# Patient Record
Sex: MALE | Race: WHITE | Employment: PART TIME | URBAN - METROPOLITAN AREA
[De-identification: names, ages, dates, MRNs, and addresses within clinical notes are randomized per-mention and may not be internally consistent; named-entity substitution may affect disease eponyms.]

---

## 2021-02-07 ENCOUNTER — HOSPITAL ENCOUNTER (EMERGENCY)
Age: 26
Discharge: HOME | End: 2021-02-07
Attending: EMERGENCY MEDICINE
Payer: MEDICAID

## 2021-02-07 ENCOUNTER — APPOINTMENT (OUTPATIENT)
Dept: GENERAL RADIOLOGY | Age: 26
End: 2021-02-07
Attending: PHYSICIAN ASSISTANT
Payer: MEDICAID

## 2021-02-07 VITALS
WEIGHT: 240 LBS | BODY MASS INDEX: 33.6 KG/M2 | SYSTOLIC BLOOD PRESSURE: 134 MMHG | TEMPERATURE: 97.8 F | DIASTOLIC BLOOD PRESSURE: 90 MMHG | OXYGEN SATURATION: 98 % | HEART RATE: 91 BPM | RESPIRATION RATE: 16 BRPM | HEIGHT: 71 IN

## 2021-02-07 DIAGNOSIS — Z72.0 CURRENT TOBACCO USE: ICD-10-CM

## 2021-02-07 DIAGNOSIS — K92.0 HEMATEMESIS WITH NAUSEA: Primary | ICD-10-CM

## 2021-02-07 DIAGNOSIS — F10.10 ALCOHOL ABUSE: ICD-10-CM

## 2021-02-07 LAB
ABO + RH BLD: NORMAL
ALBUMIN SERPL-MCNC: 3.8 G/DL (ref 3.4–5)
ALBUMIN/GLOB SERPL: 1 {RATIO} (ref 0.8–1.7)
ALP SERPL-CCNC: 56 U/L (ref 45–117)
ALT SERPL-CCNC: 55 U/L (ref 16–61)
ANION GAP SERPL CALC-SCNC: 10 MMOL/L (ref 3–18)
AST SERPL-CCNC: 58 U/L (ref 10–38)
ATRIAL RATE: 73 BPM
BASOPHILS # BLD: 0 K/UL (ref 0–0.1)
BASOPHILS NFR BLD: 1 % (ref 0–2)
BILIRUB SERPL-MCNC: 0.3 MG/DL (ref 0.2–1)
BLOOD GROUP ANTIBODIES SERPL: NORMAL
BUN SERPL-MCNC: 9 MG/DL (ref 7–18)
BUN/CREAT SERPL: 9 (ref 12–20)
CALCIUM SERPL-MCNC: 8.8 MG/DL (ref 8.5–10.1)
CALCULATED P AXIS, ECG09: -3 DEGREES
CALCULATED R AXIS, ECG10: -8 DEGREES
CALCULATED T AXIS, ECG11: 4 DEGREES
CHLORIDE SERPL-SCNC: 106 MMOL/L (ref 100–111)
CK MB CFR SERPL CALC: NORMAL % (ref 0–4)
CK MB SERPL-MCNC: <1 NG/ML (ref 5–25)
CK SERPL-CCNC: 170 U/L (ref 39–308)
CO2 SERPL-SCNC: 25 MMOL/L (ref 21–32)
CREAT SERPL-MCNC: 1 MG/DL (ref 0.6–1.3)
DIAGNOSIS, 93000: NORMAL
DIFFERENTIAL METHOD BLD: ABNORMAL
EOSINOPHIL # BLD: 0.1 K/UL (ref 0–0.4)
EOSINOPHIL NFR BLD: 2 % (ref 0–5)
ERYTHROCYTE [DISTWIDTH] IN BLOOD BY AUTOMATED COUNT: 12.6 % (ref 11.6–14.5)
GLOBULIN SER CALC-MCNC: 3.8 G/DL (ref 2–4)
GLUCOSE SERPL-MCNC: 93 MG/DL (ref 74–99)
HCT VFR BLD AUTO: 41.4 % (ref 36–48)
HGB BLD-MCNC: 14.3 G/DL (ref 13–16)
LIPASE SERPL-CCNC: 134 U/L (ref 73–393)
LYMPHOCYTES # BLD: 2 K/UL (ref 0.9–3.6)
LYMPHOCYTES NFR BLD: 39 % (ref 21–52)
MCH RBC QN AUTO: 31.6 PG (ref 24–34)
MCHC RBC AUTO-ENTMCNC: 34.5 G/DL (ref 31–37)
MCV RBC AUTO: 91.4 FL (ref 74–97)
MONOCYTES # BLD: 0.5 K/UL (ref 0.05–1.2)
MONOCYTES NFR BLD: 9 % (ref 3–10)
NEUTS SEG # BLD: 2.6 K/UL (ref 1.8–8)
NEUTS SEG NFR BLD: 49 % (ref 40–73)
P-R INTERVAL, ECG05: 132 MS
PLATELET # BLD AUTO: 223 K/UL (ref 135–420)
PMV BLD AUTO: 9.4 FL (ref 9.2–11.8)
POTASSIUM SERPL-SCNC: 3.8 MMOL/L (ref 3.5–5.5)
PROT SERPL-MCNC: 7.6 G/DL (ref 6.4–8.2)
Q-T INTERVAL, ECG07: 356 MS
QRS DURATION, ECG06: 86 MS
QTC CALCULATION (BEZET), ECG08: 392 MS
RBC # BLD AUTO: 4.53 M/UL (ref 4.7–5.5)
SODIUM SERPL-SCNC: 141 MMOL/L (ref 136–145)
SPECIMEN EXP DATE BLD: NORMAL
TROPONIN I SERPL-MCNC: <0.02 NG/ML (ref 0–0.04)
VENTRICULAR RATE, ECG03: 73 BPM
WBC # BLD AUTO: 5.3 K/UL (ref 4.6–13.2)

## 2021-02-07 PROCEDURE — C9113 INJ PANTOPRAZOLE SODIUM, VIA: HCPCS | Performed by: PHYSICIAN ASSISTANT

## 2021-02-07 PROCEDURE — 82553 CREATINE MB FRACTION: CPT

## 2021-02-07 PROCEDURE — 96374 THER/PROPH/DIAG INJ IV PUSH: CPT

## 2021-02-07 PROCEDURE — 80053 COMPREHEN METABOLIC PANEL: CPT

## 2021-02-07 PROCEDURE — 96375 TX/PRO/DX INJ NEW DRUG ADDON: CPT

## 2021-02-07 PROCEDURE — 71045 X-RAY EXAM CHEST 1 VIEW: CPT

## 2021-02-07 PROCEDURE — 99282 EMERGENCY DEPT VISIT SF MDM: CPT

## 2021-02-07 PROCEDURE — 83690 ASSAY OF LIPASE: CPT

## 2021-02-07 PROCEDURE — 85025 COMPLETE CBC W/AUTO DIFF WBC: CPT

## 2021-02-07 PROCEDURE — 93005 ELECTROCARDIOGRAM TRACING: CPT

## 2021-02-07 PROCEDURE — 96361 HYDRATE IV INFUSION ADD-ON: CPT

## 2021-02-07 PROCEDURE — 86901 BLOOD TYPING SEROLOGIC RH(D): CPT

## 2021-02-07 PROCEDURE — 74011250636 HC RX REV CODE- 250/636: Performed by: PHYSICIAN ASSISTANT

## 2021-02-07 RX ORDER — PANTOPRAZOLE SODIUM 40 MG/1
40 TABLET, DELAYED RELEASE ORAL DAILY
Qty: 30 TAB | Refills: 0 | Status: SHIPPED | OUTPATIENT
Start: 2021-02-07 | End: 2021-03-09

## 2021-02-07 RX ORDER — ONDANSETRON 4 MG/1
4 TABLET, ORALLY DISINTEGRATING ORAL
Qty: 12 TAB | Refills: 0 | Status: SHIPPED | OUTPATIENT
Start: 2021-02-07

## 2021-02-07 RX ORDER — PANTOPRAZOLE SODIUM 40 MG/10ML
40 INJECTION, POWDER, LYOPHILIZED, FOR SOLUTION INTRAVENOUS
Status: COMPLETED | OUTPATIENT
Start: 2021-02-07 | End: 2021-02-07

## 2021-02-07 RX ORDER — ONDANSETRON 2 MG/ML
4 INJECTION INTRAMUSCULAR; INTRAVENOUS
Status: COMPLETED | OUTPATIENT
Start: 2021-02-07 | End: 2021-02-07

## 2021-02-07 RX ADMIN — PANTOPRAZOLE SODIUM 40 MG: 40 INJECTION, POWDER, FOR SOLUTION INTRAVENOUS at 19:08

## 2021-02-07 RX ADMIN — SODIUM CHLORIDE 1000 ML: 900 INJECTION, SOLUTION INTRAVENOUS at 19:04

## 2021-02-07 RX ADMIN — ONDANSETRON 4 MG: 2 INJECTION INTRAMUSCULAR; INTRAVENOUS at 19:08

## 2021-02-07 NOTE — ED PROVIDER NOTES
EMERGENCY DEPARTMENT HISTORY AND PHYSICAL EXAM    Date: 2/7/2021  Patient Name: Sami Weaver    History of Presenting Illness     Chief Complaint   Patient presents with    Vomiting Blood         History Provided By: Patient    6:36 PM  Sami Weaver is a 31 y.o. male who presents to the emergency department C/O of vomiting blood. Patient states just prior to arrival he was laughing, suddenly felt nauseous and like he would vomit. He then vomited bright red blood which subsequently became dark. 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 Patient does admit to smoking cigarettes but after armani Covid last month he has significantly decreased. He also drinks alcohol daily for at least the past year, more so over the past month; approximately 4th of a liter of vodka daily. No frequent NSAID use. Pt denies chest pain, shortness of breath, bloody or black stools, diarrhea, abdominal pain, and any other sxs or complaints. PCP: Yulissa Cruz MD        Past History     Past Medical History:  Past Medical History:   Diagnosis Date    ADHD     Anxiety     [facility]     Multiple personalities [facility])        Past Surgical History:  History reviewed. No pertinent surgical history. Family History:  History reviewed. No pertinent family history. Social History:  Social History     Tobacco Use    Smoking status: Current Every Day Smoker   Substance Use Topics    Alcohol use: Yes     Comment: daily 4 shots every day    Drug use: Never       Allergies: Allergies   Allergen Reactions    Erythromycin Nausea and Vomiting    Morphine Other (comments)     Patient reports hallucinations         Review of Systems   Review of Systems   Constitutional: Negative for fever. Respiratory: Negative for shortness of breath. Cardiovascular: Negative for chest pain. Gastrointestinal: Positive for nausea and vomiting.  Negative for abdominal pain and blood in stool. All other systems reviewed and are negative. Physical Exam     Vitals:    02/07/21 1834   BP: (!) 134/90   Pulse: 91   Resp: 16   Temp: 97.8 °F (36.6 °C)   SpO2: 98%   Weight: 108.9 kg (240 lb)   Height: 5' 11\" (1.803 m)     Physical Exam    Vital signs and nursing notes reviewed. CONSTITUTIONAL: Alert. Well-appearing; well-nourished; appears uncomfortable, but not in distress. HEAD: Normocephalic; atraumatic. EYES: Conjunctiva clear. CV: Normal S1, S2; no murmurs, rubs, or gallops. No chest wall tenderness. RESPIRATORY: Normal chest excursion with respiration; breath sounds clear and equal bilaterally; no wheezes, rhonchi, or rales. GI: Normal bowel sounds; non-distended; non-tender; no guarding or rigidity; no palpable organomegaly. No CVA tenderness. SKIN: Normal for age and race; warm; dry; good turgor; no apparent lesions or exudate. NEURO: A & O x3. PSYCH:  Mood and affect appropriate. Diagnostic Study Results     Labs -     Recent Results (from the past 12 hour(s))   CBC WITH AUTOMATED DIFF    Collection Time: 02/07/21  6:54 PM   Result Value Ref Range    WBC 5.3 4.6 - 13.2 K/uL    RBC 4.53 (L) 4.70 - 5.50 M/uL    HGB 14.3 13.0 - 16.0 g/dL    HCT 41.4 36.0 - 48.0 %    MCV 91.4 74.0 - 97.0 FL    MCH 31.6 24.0 - 34.0 PG    MCHC 34.5 31.0 - 37.0 g/dL    RDW 12.6 11.6 - 14.5 %    PLATELET 288 267 - 148 K/uL    MPV 9.4 9.2 - 11.8 FL    NEUTROPHILS 49 40 - 73 %    LYMPHOCYTES 39 21 - 52 %    MONOCYTES 9 3 - 10 %    EOSINOPHILS 2 0 - 5 %    BASOPHILS 1 0 - 2 %    ABS. NEUTROPHILS 2.6 1.8 - 8.0 K/UL    ABS. LYMPHOCYTES 2.0 0.9 - 3.6 K/UL    ABS. MONOCYTES 0.5 0.05 - 1.2 K/UL    ABS. EOSINOPHILS 0.1 0.0 - 0.4 K/UL    ABS.  BASOPHILS 0.0 0.0 - 0.1 K/UL    DF AUTOMATED     METABOLIC PANEL, COMPREHENSIVE    Collection Time: 02/07/21  6:54 PM   Result Value Ref Range    Sodium 141 136 - 145 mmol/L    Potassium 3.8 3.5 - 5.5 mmol/L    Chloride 106 100 - 111 mmol/L    CO2 25 21 - 32 mmol/L    Anion gap 10 3.0 - 18 mmol/L    Glucose 93 74 - 99 mg/dL    BUN 9 7.0 - 18 MG/DL    Creatinine 1.00 0.6 - 1.3 MG/DL    BUN/Creatinine ratio 9 (L) 12 - 20      GFR est AA >60 >60 ml/min/1.73m2    GFR est non-AA >60 >60 ml/min/1.73m2    Calcium 8.8 8.5 - 10.1 MG/DL    Bilirubin, total 0.3 0.2 - 1.0 MG/DL    ALT (SGPT) 55 16 - 61 U/L    AST (SGOT) 58 (H) 10 - 38 U/L    Alk.  phosphatase 56 45 - 117 U/L    Protein, total 7.6 6.4 - 8.2 g/dL    Albumin 3.8 3.4 - 5.0 g/dL    Globulin 3.8 2.0 - 4.0 g/dL    A-G Ratio 1.0 0.8 - 1.7     LIPASE    Collection Time: 02/07/21  6:54 PM   Result Value Ref Range    Lipase 134 73 - 393 U/L   CARDIAC PANEL,(CK, CKMB & TROPONIN)    Collection Time: 02/07/21  6:54 PM   Result Value Ref Range    CK - MB <1.0 <3.6 ng/ml    CK-MB Index  0.0 - 4.0 %     CALCULATION NOT PERFORMED WHEN RESULT IS BELOW LINEAR LIMIT     39 - 308 U/L    Troponin-I, QT <0.02 0.0 - 0.045 NG/ML   TYPE & SCREEN    Collection Time: 02/07/21  7:00 PM   Result Value Ref Range    Crossmatch Expiration 02/10/2021,2359     ABO/Rh(D) A POSITIVE     Antibody screen NEG    EKG, 12 LEAD, INITIAL    Collection Time: 02/07/21  7:07 PM   Result Value Ref Range    Ventricular Rate 73 BPM    Atrial Rate 73 BPM    P-R Interval 132 ms    QRS Duration 86 ms    Q-T Interval 356 ms    QTC Calculation (Bezet) 392 ms    Calculated P Axis -3 degrees    Calculated R Axis -8 degrees    Calculated T Axis 4 degrees    Diagnosis       Normal sinus rhythm  Normal ECG  No previous ECGs available         Radiologic Studies -   XR CHEST PORT   Final Result   No acute process        CT Results  (Last 48 hours)    None        CXR Results  (Last 48 hours)               02/07/21 1924  XR CHEST PORT Final result    Impression:  No acute process       Narrative:  EXAM:  AP Portable Chest X-ray 1 view        INDICATION: Chest pain       COMPARISON: June 28, 2014       _______________       FINDINGS:  Heart and mediastinal contours are within normal limits for portable   radiograph. Lungs are clear of active disease. There are no pleural effusions. No acute osseous findings. ________________                     Medications given in the ED-  Medications   sodium chloride 0.9 % bolus infusion 1,000 mL (1,000 mL IntraVENous New Bag 2/7/21 1904)   pantoprazole (PROTONIX) injection 40 mg (40 mg IntraVENous Given 2/7/21 1908)   ondansetron (ZOFRAN) injection 4 mg (4 mg IntraVENous Given 2/7/21 1908)         Medical Decision Making   I am the first provider for this patient. I reviewed the vital signs, available nursing notes, past medical history, past surgical history, family history and social history. Vital Signs-Reviewed the patient's vital signs. Pulse Oximetry Analysis - 98% on RA       EKG interpretation: (Preliminary)  Normal sinus rhythm, rate 73, no acute changes    Records Reviewed: Nursing Notes      Procedures:  Procedures    ED Course:  6:36 PM   Initial assessment performed. The patients presenting problems have been discussed, and they are in agreement with the care plan formulated and outlined with them. I have encouraged them to ask questions as they arise throughout their visit. 9 PM progress note  Patient feels much better, drinking water without further vomiting. He did vomit once when he first arrived but no longer bloody and brownish-yellow in color. Suspect gastritis or bleeding peptic ulcer disease from tobacco and alcohol use. Possibly but less likely esophageal varices. His vitals are stable, H&H is normal.  Rest of labs unremarkable. Will start on PPI, Zofran as needed and referred to gastroenterology. Otherwise return to ED if any worsening of symptoms recurrence of hematemesis.  consult placed to assist with referral for substance abuse help. Diagnosis and Disposition       DISCHARGE NOTE:    Jonathan Veras  results have been reviewed with him. He has been counseled regarding his diagnosis, treatment, and plan. He verbally conveys understanding and agreement of the signs, symptoms, diagnosis, treatment and prognosis and additionally agrees to follow up as discussed. He also agrees with the care-plan and conveys that all of his questions have been answered. I have also provided discharge instructions for him that include: educational information regarding their diagnosis and treatment, and list of reasons why they would want to return to the ED prior to their follow-up appointment, should his condition change. He has been provided with education for proper emergency department utilization. CLINICAL IMPRESSION:    1. Hematemesis with nausea    2. Alcohol abuse    3. Current tobacco use        PLAN:  1. D/C Home  2. Current Discharge Medication List      START taking these medications    Details   pantoprazole (Protonix) 40 mg tablet Take 1 Tab by mouth daily for 30 days. Qty: 30 Tab, Refills: 0      ondansetron (Zofran ODT) 4 mg disintegrating tablet Take 1 Tab by mouth every eight (8) hours as needed for Nausea. Qty: 12 Tab, Refills: 0           3. Follow-up Information     Follow up With Specialties Details Why Contact Info    Xavi Camacho MD Internal Medicine Schedule an appointment as soon as possible for a visit   [facility]  [address] [place] [address]      Benny Rojas MD Gastroenterology Schedule an appointment as soon as possible for a visit   [address] Jason [address] [facility] [address] [phone]      [facility] EMERGENCY DEPT Emergency Medicine  As needed, If symptoms worsen [address] [address]  [phone]        _______________________________      Please note that this dictation was completed with [employer], the [employer] voice recognition software.   Quite often unanticipated grammatical, syntax, homophones, and other interpretive errors are inadvertently transcribed by the computer software. Please disregard these errors. Please excuse any errors that have escaped final proofreading.

## 2021-02-08 NOTE — ED NOTES
Report received from [facility]. Assumed care of patient at this time.

## 2021-02-08 NOTE — ED NOTES
Patient had initially informed RN that he had covid twice. Asked s/o to wait while RN triaged patient after RN did triage found out patient had a negative test. Allowed visitor to come in states he did not understand why he was not allowed to come back informed him of screening for covid he states well OK.  Did allow him to come back due to no hx of covid for visitor and patient is negative and patient asked if he come back to room

## 2022-03-18 ENCOUNTER — APPOINTMENT (OUTPATIENT)
Dept: GENERAL RADIOLOGY | Age: 27
End: 2022-03-18
Attending: EMERGENCY MEDICINE
Payer: MEDICAID

## 2022-03-18 ENCOUNTER — HOSPITAL ENCOUNTER (EMERGENCY)
Age: 27
Discharge: HOME | End: 2022-03-18
Attending: EMERGENCY MEDICINE
Payer: MEDICAID

## 2022-03-18 VITALS
SYSTOLIC BLOOD PRESSURE: 117 MMHG | RESPIRATION RATE: 22 BRPM | OXYGEN SATURATION: 100 % | TEMPERATURE: 99.1 F | HEIGHT: 71 IN | WEIGHT: 240 LBS | DIASTOLIC BLOOD PRESSURE: 78 MMHG | HEART RATE: 101 BPM | BODY MASS INDEX: 33.6 KG/M2

## 2022-03-18 DIAGNOSIS — S83.91XA SPRAIN OF RIGHT KNEE, UNSPECIFIED LIGAMENT, INITIAL ENCOUNTER: Primary | ICD-10-CM

## 2022-03-18 PROCEDURE — 99284 EMERGENCY DEPT VISIT MOD MDM: CPT

## 2022-03-18 PROCEDURE — 96372 THER/PROPH/DIAG INJ SC/IM: CPT

## 2022-03-18 PROCEDURE — 73562 X-RAY EXAM OF KNEE 3: CPT

## 2022-03-18 PROCEDURE — 74011250636 HC RX REV CODE- 250/636: Performed by: EMERGENCY MEDICINE

## 2022-03-18 RX ORDER — NAPROXEN 500 MG/1
500 TABLET ORAL 2 TIMES DAILY WITH MEALS
Qty: 20 TABLET | Refills: 0 | Status: SHIPPED | OUTPATIENT
Start: 2022-03-18 | End: 2022-03-18 | Stop reason: SDUPTHER

## 2022-03-18 RX ORDER — KETOROLAC TROMETHAMINE 15 MG/ML
15 INJECTION, SOLUTION INTRAMUSCULAR; INTRAVENOUS
Status: COMPLETED | OUTPATIENT
Start: 2022-03-18 | End: 2022-03-18

## 2022-03-18 RX ORDER — NAPROXEN 500 MG/1
500 TABLET ORAL 2 TIMES DAILY WITH MEALS
Qty: 20 TABLET | Refills: 0 | Status: SHIPPED | OUTPATIENT
Start: 2022-03-18 | End: 2022-03-28

## 2022-03-18 RX ADMIN — KETOROLAC TROMETHAMINE 15 MG: 15 INJECTION, SOLUTION INTRAMUSCULAR; INTRAVENOUS at 19:27
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
## 2022-03-18 NOTE — Clinical Note
[address] [facility] EMERGENCY DEPARTMENT  [address] [facility] [address] [address]  [phone]    Work/School Note    Date: 3/18/2022    To Whom It May concern:    Leonides Quiñonez was seen and treated today in the emergency room by the following provider(s):  Attending Provider: Nusrat Avilez DO. Leonides Quiñonez is excused from work/school on 3/18/2022 through 3/20/2022. He is medically clear to return to work/school on 3/21/2022.          Sincerely,          Marychuy Jennings DO

## 2022-03-18 NOTE — Clinical Note
[facility] EMERGENCY DEPARTMENT  [address] [address]  [phone]    Work/School Note    Date: 3/18/2022    To Whom It May concern:    Gerson Vu was seen and treated today in the emergency room by the following provider(s):  Attending Provider: Tejas Archer DO. Gerson Vu is excused from work/school on 3/18/2022 through 3/20/2022. He is medically clear to return to work/school on 3/21/2022.          Sincerely,          Unique Pratt
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
## 2022-03-18 NOTE — DISCHARGE INSTRUCTIONS
Thank you! Thank you for allowing me to care for you in the emergency department. I sincerely hope that you are satisfied with your visit today. It is my goal to provide you with excellent care. Below you will find a list of your labs and imaging from your visit today. Should you have any questions regarding these results please do not hesitate to call the emergency department. Labs -   No results found for this or any previous visit (from the past 12 hour(s)). Radiologic Studies -   XR KNEE RT 3 V   Final Result   Negative. CT Results  (Last 48 hours)      None          CXR Results  (Last 48 hours)      None               If you feel that you have not received excellent quality care or timely care, please ask to speak to the nurse manager. Please choose us in the future for your continued health care needs. ------------------------------------------------------------------------------------------------------------  The exam and treatment you received in the Emergency Department were for an urgent problem and are not intended as complete care. It is important that you follow-up with a doctor, nurse practitioner, or physician assistant to:  (1) confirm your diagnosis,  (2) re-evaluation of changes in your illness and treatment, and  (3) for ongoing care. If your symptoms become worse or you do not improve as expected and you are unable to reach your usual health care provider, you should return to the Emergency Department. We are available 24 hours a day. Please take your discharge instructions with you when you go to your follow-up appointment. If you have any problem arranging a follow-up appointment, contact the Emergency Department immediately. If a prescription has been provided, please have it filled as soon as possible to prevent a delay in treatment. Read the entire medication instruction sheet provided to you by the pharmacy.  If you have any questions or reservations about taking the medication due to side effects or interactions with other medications, please call your primary care physician or contact the ER to speak with the charge nurse. Make an appointment with your family doctor or the physician you were referred to for follow-up of this visit as instructed on your discharge paperwork, as this is a mandatory follow-up. Return to the ER if you are unable to be seen or if you are unable to be seen in a timely manner. If you have any problem arranging the follow-up visit, contact the Emergency Department immediately.

## 2022-09-27 PROCEDURE — 99284 EMERGENCY DEPT VISIT MOD MDM: CPT

## 2022-09-27 PROCEDURE — 96374 THER/PROPH/DIAG INJ IV PUSH: CPT

## 2022-09-27 PROCEDURE — 96375 TX/PRO/DX INJ NEW DRUG ADDON: CPT

## 2022-09-28 ENCOUNTER — HOSPITAL ENCOUNTER (EMERGENCY)
Age: 27
Discharge: HOME | End: 2022-09-28
Attending: EMERGENCY MEDICINE
Payer: MEDICAID

## 2022-09-28 VITALS
TEMPERATURE: 97.9 F | BODY MASS INDEX: 32.9 KG/M2 | HEART RATE: 74 BPM | RESPIRATION RATE: 17 BRPM | WEIGHT: 235 LBS | DIASTOLIC BLOOD PRESSURE: 104 MMHG | OXYGEN SATURATION: 97 % | HEIGHT: 71 IN | SYSTOLIC BLOOD PRESSURE: 143 MMHG

## 2022-09-28 DIAGNOSIS — K29.90 GASTRITIS AND DUODENITIS: ICD-10-CM

## 2022-09-28 DIAGNOSIS — R11.2 NAUSEA AND VOMITING, UNSPECIFIED VOMITING TYPE: Primary | ICD-10-CM

## 2022-09-28 LAB
ALBUMIN SERPL-MCNC: 4 G/DL (ref 3.5–5)
ALBUMIN/GLOB SERPL: 1.1 {RATIO} (ref 1.1–2.2)
ALP SERPL-CCNC: 56 U/L (ref 45–117)
ALT SERPL-CCNC: 40 U/L (ref 12–78)
ANION GAP SERPL CALC-SCNC: 11 MMOL/L (ref 5–15)
AST SERPL W P-5'-P-CCNC: 41 U/L (ref 15–37)
BASOPHILS # BLD: 0 K/UL (ref 0–0.1)
BASOPHILS NFR BLD: 1 % (ref 0–1)
BILIRUB DIRECT SERPL-MCNC: 0.1 MG/DL (ref 0–0.2)
BILIRUB SERPL-MCNC: 0.4 MG/DL (ref 0.2–1)
BUN SERPL-MCNC: 13 MG/DL (ref 6–20)
BUN/CREAT SERPL: 11 (ref 12–20)
CA-I BLD-MCNC: 9 MG/DL (ref 8.5–10.1)
CHLORIDE SERPL-SCNC: 103 MMOL/L (ref 97–108)
CO2 SERPL-SCNC: 26 MMOL/L (ref 21–32)
CREAT SERPL-MCNC: 1.23 MG/DL (ref 0.7–1.3)
DIFFERENTIAL METHOD BLD: NORMAL
EOSINOPHIL # BLD: 0.1 K/UL (ref 0–0.4)
EOSINOPHIL NFR BLD: 1 % (ref 0–7)
ERYTHROCYTE [DISTWIDTH] IN BLOOD BY AUTOMATED COUNT: 12 % (ref 11.5–14.5)
ETHANOL SERPL-MCNC: <10 MG/DL
GLOBULIN SER CALC-MCNC: 3.7 G/DL (ref 2–4)
GLUCOSE SERPL-MCNC: 96 MG/DL (ref 65–100)
HCT VFR BLD AUTO: 39.9 % (ref 36.6–50.3)
HGB BLD-MCNC: 14.3 G/DL (ref 12.1–17)
IMM GRANULOCYTES # BLD AUTO: 0 K/UL (ref 0–0.04)
IMM GRANULOCYTES NFR BLD AUTO: 0 % (ref 0–0.5)
LIPASE SERPL-CCNC: 83 U/L (ref 73–393)
LYMPHOCYTES # BLD: 1.7 K/UL (ref 0.8–3.5)
LYMPHOCYTES NFR BLD: 23 % (ref 12–49)
MCH RBC QN AUTO: 32.1 PG (ref 26–34)
MCHC RBC AUTO-ENTMCNC: 35.8 G/DL (ref 30–36.5)
MCV RBC AUTO: 89.7 FL (ref 80–99)
MONOCYTES # BLD: 0.5 K/UL (ref 0–1)
MONOCYTES NFR BLD: 7 % (ref 5–13)
NEUTS SEG # BLD: 5 K/UL (ref 1.8–8)
NEUTS SEG NFR BLD: 68 % (ref 32–75)
PLATELET # BLD AUTO: 260 K/UL (ref 150–400)
PMV BLD AUTO: 9.2 FL (ref 8.9–12.9)
POTASSIUM SERPL-SCNC: 3.9 MMOL/L (ref 3.5–5.1)
PROT SERPL-MCNC: 7.7 G/DL (ref 6.4–8.2)
RBC # BLD AUTO: 4.45 M/UL (ref 4.1–5.7)
SODIUM SERPL-SCNC: 140 MMOL/L (ref 136–145)
WBC # BLD AUTO: 7.3 K/UL (ref 4.1–11.1)

## 2022-09-28 PROCEDURE — 74011250636 HC RX REV CODE- 250/636: Performed by: EMERGENCY MEDICINE

## 2022-09-28 PROCEDURE — 83690 ASSAY OF LIPASE: CPT

## 2022-09-28 PROCEDURE — 74011000258 HC RX REV CODE- 258: Performed by: EMERGENCY MEDICINE

## 2022-09-28 PROCEDURE — 80076 HEPATIC FUNCTION PANEL: CPT

## 2022-09-28 PROCEDURE — 85025 COMPLETE CBC W/AUTO DIFF WBC: CPT

## 2022-09-28 PROCEDURE — 36415 COLL VENOUS BLD VENIPUNCTURE: CPT

## 2022-09-28 PROCEDURE — 82077 ASSAY SPEC XCP UR&BREATH IA: CPT

## 2022-09-28 PROCEDURE — 80048 BASIC METABOLIC PNL TOTAL CA: CPT

## 2022-09-28 RX ORDER — OMEPRAZOLE 20 MG/1
20 CAPSULE, DELAYED RELEASE ORAL DAILY
Qty: 14 CAPSULE | Refills: 0 | Status: SHIPPED | OUTPATIENT
Start: 2022-09-28 | End: 2022-10-12

## 2022-09-28 RX ORDER — HALOPERIDOL 5 MG/ML
5 INJECTION INTRAMUSCULAR ONCE
Status: COMPLETED | OUTPATIENT
Start: 2022-09-28 | End: 2022-09-28

## 2022-09-28 RX ORDER — ONDANSETRON 2 MG/ML
4 INJECTION INTRAMUSCULAR; INTRAVENOUS
Status: COMPLETED | OUTPATIENT
Start: 2022-09-28 | End: 2022-09-28

## 2022-09-28 RX ORDER — ONDANSETRON 4 MG/1
4 TABLET, FILM COATED ORAL
Qty: 12 TABLET | Refills: 0 | Status: SHIPPED | OUTPATIENT
Start: 2022-09-28 | End: 2022-10-02

## 2022-09-28 RX ADMIN — SODIUM CHLORIDE 1000 ML: 9 INJECTION, SOLUTION INTRAVENOUS at 00:32

## 2022-09-28 RX ADMIN — FAMOTIDINE 20 MG: 10 INJECTION INTRAVENOUS at 00:31

## 2022-09-28 RX ADMIN — HALOPERIDOL LACTATE 5 MG: 5 INJECTION, SOLUTION INTRAMUSCULAR at 01:07

## 2022-09-28 RX ADMIN — ONDANSETRON 4 MG: 2 INJECTION INTRAMUSCULAR; INTRAVENOUS at 00:31

## 2022-09-28 NOTE — ED TRIAGE NOTES
Pt reports vomiting x 4 days. Pt reports [facility] where they did a COVID and FLU swab which were negative. Pt concerned that it may be \"stress induced from his job. \"  Possible ulcer? Pt reports seeing blood in his vomit. Pt reports he has had a history of an ulcer last year.

## 2022-09-28 NOTE — Clinical Note
[address]  [address] [facility] [address]  [phone]    Work/School Note    Date: 9/27/2022    To Whom It May concern:    Diana Salazar was seen and treated today in the emergency room by the following provider(s):  Attending Provider: Stan Maria MD.      Diana Salazar is excused from work/school on 09/28/22 and 09/29/22. He is medically clear to return to work/school on 9/30/2022.        Sincerely,          Oz Garcia MD

## 2022-09-28 NOTE — ED PROVIDER NOTES
EMERGENCY DEPARTMENT HISTORY AND PHYSICAL EXAM      Date: 9/28/2022  Patient Name: Zahraa Lee    History of Presenting Illness     Chief Complaint   Patient presents with    Vomiting    Abdominal Pain       History Provided By: Patient    HPI: Zahraa Lee, 28 y.o. male   presents to the ED with cc of vomiting. Patient complains of nausea with intermittent episode of vomiting for last 4 days. Patient also complains of epigastric abdominal discomfort that has been constant for last 4 days. Patient stated he drank binge of EtOH and smoked marijuana prior to the onset of symptoms. Patient states that he saw a speck of blood on his vomitus but without melena or bright red blood per rectum. Patient was seen at the urgent care for the symptoms 4 days ago and was tested negative for COVID and flu. No URI symptoms. PCP: Sarah Tanner MD    No current facility-administered medications on file prior to encounter. Current Outpatient Medications on File Prior to Encounter   Medication Sig Dispense Refill    ondansetron (Zofran ODT) 4 mg disintegrating tablet Take 1 Tab by mouth every eight (8) hours as needed for Nausea. 12 Tab 0       Past History     Past Medical History:  Past Medical History:   Diagnosis Date    ADHD     Anxiety     Bipolar 1 disorder ([facility] [address]     Multiple personalities ([facility] [address]     Stomach ulcer        Past Surgical History:  History reviewed. No pertinent surgical history. Family History:  History reviewed. No pertinent family history. Social History:  Social History     Tobacco Use    Smoking status: Every Day     Packs/day: 0.25     Types: Cigarettes    Smokeless tobacco: Never   Substance Use Topics    Alcohol use: Yes     Comment: daily heavy drinker. Drug use: Yes     Types: Marijuana       Allergies:   Allergies   Allergen Reactions    Erythromycin Nausea and Vomiting    Morphine Other (comments)     Patient reports hallucinations         Review of Systems   Review of Systems   Constitutional:  Negative for chills and fever. HENT:  Negative for sore throat. Eyes:  Negative for discharge. Respiratory:  Negative for cough. Cardiovascular:  Negative for chest pain. Gastrointestinal:  Positive for abdominal pain, nausea and vomiting. Genitourinary:  Negative for difficulty urinating. Musculoskeletal:  Negative for arthralgias. Skin:  Negative for rash. Neurological:  Negative for headaches. Physical Exam   Physical Exam  Vitals and nursing note reviewed. Constitutional:       General: He is not in acute distress. Appearance: Normal appearance. He is not ill-appearing, toxic-appearing or diaphoretic. HENT:      Head: Normocephalic and atraumatic. Nose: No congestion. Mouth/Throat:      Mouth: Mucous membranes are moist.   Eyes:      General: No scleral icterus. Conjunctiva/sclera: Conjunctivae normal.   Cardiovascular:      Rate and Rhythm: Normal rate and regular rhythm. Heart sounds: Normal heart sounds. Pulmonary:      Effort: Pulmonary effort is normal.      Breath sounds: Normal breath sounds. Abdominal:      General: Abdomen is flat. Bowel sounds are normal. There is no distension. Palpations: Abdomen is soft. Tenderness: There is abdominal tenderness. There is no guarding or rebound. Comments: Mild epigastric tenderness without guarding or rebound   Musculoskeletal:      Cervical back: Neck supple. Right lower leg: No edema. Left lower leg: No edema. Skin:     General: Skin is warm and dry. Neurological:      General: No focal deficit present. Mental Status: He is alert. Psychiatric:         Behavior: Behavior normal.         Thought Content:  Thought content normal.       Diagnostic Study Results     Labs -     Recent Results (from the past 12 hour(s))   CBC WITH AUTOMATED DIFF    Collection Time: 09/28/22 12:15 AM   Result Value Ref Range    WBC 7.3 4.1 - 11.1 K/uL    RBC 4.45 4.10 - 5.70 M/uL    HGB 14.3 12.1 - 17.0 g/dL    HCT 39.9 36.6 - 50.3 %    MCV 89.7 80.0 - 99.0 FL    MCH 32.1 26.0 - 34.0 PG    MCHC 35.8 30.0 - 36.5 g/dL    RDW 12.0 11.5 - 14.5 %    PLATELET 768 293 - 908 K/uL    MPV 9.2 8.9 - 12.9 FL    NEUTROPHILS 68 32 - 75 %    LYMPHOCYTES 23 12 - 49 %    MONOCYTES 7 5 - 13 %    EOSINOPHILS 1 0 - 7 %    BASOPHILS 1 0 - 1 %    IMMATURE GRANULOCYTES 0 0.0 - 0.5 %    ABS. NEUTROPHILS 5.0 1.8 - 8.0 K/UL    ABS. LYMPHOCYTES 1.7 0.8 - 3.5 K/UL    ABS. MONOCYTES 0.5 0.0 - 1.0 K/UL    ABS. EOSINOPHILS 0.1 0.0 - 0.4 K/UL    ABS. BASOPHILS 0.0 0.0 - 0.1 K/UL    ABS. IMM. GRANS. 0.0 0.00 - 0.04 K/UL    DF AUTOMATED     METABOLIC PANEL, BASIC    Collection Time: 09/28/22 12:15 AM   Result Value Ref Range    Sodium 140 136 - 145 mmol/L    Potassium 3.9 3.5 - 5.1 mmol/L    Chloride 103 97 - 108 mmol/L    CO2 26 21 - 32 mmol/L    Anion gap 11 5 - 15 mmol/L    Glucose 96 65 - 100 mg/dL    BUN 13 6 - 20 mg/dL    Creatinine 1.23 0.70 - 1.30 mg/dL    BUN/Creatinine ratio 11 (L) 12 - 20      GFR est AA >60 >60 ml/min/1.73m2    GFR est non-AA >60 >60 ml/min/1.73m2    Calcium 9.0 8.5 - 10.1 mg/dL   HEPATIC FUNCTION PANEL    Collection Time: 09/28/22 12:15 AM   Result Value Ref Range    Protein, total 7.7 6.4 - 8.2 g/dL    Albumin 4.0 3.5 - 5.0 g/dL    Globulin 3.7 2.0 - 4.0 g/dL    A-G Ratio 1.1 1.1 - 2.2      Bilirubin, total 0.4 0.2 - 1.0 mg/dL    Bilirubin, direct 0.1 0.0 - 0.2 mg/dL    Alk.  phosphatase 56 45 - 117 U/L    AST (SGOT) 41 (H) 15 - 37 U/L    ALT (SGPT) 40 12 - 78 U/L   ETHYL ALCOHOL    Collection Time: 09/28/22 12:15 AM   Result Value Ref Range    ALCOHOL(ETHYL),SERUM <10 <10 mg/dL   LIPASE    Collection Time: 09/28/22 12:15 AM   Result Value Ref Range    Lipase 83 73 - 393 U/L       Radiologic Studies -   No orders to display     CT Results  (Last 48 hours)      None          CXR Results  (Last 48 hours)      None              Medical Decision Making   I am the first provider for this patient. I reviewed the vital signs, available nursing notes, past medical history, past surgical history, family history and social history. Vital Signs-Reviewed the patient's vital signs. Patient Vitals for the past 12 hrs:   Temp Pulse Resp BP SpO2   09/28/22 0007 97.9 °F (36.6 °C) 74 17 (!) 143/104 97 %       Records Reviewed:     Provider Notes (Medical Decision Making):       ED Course:   Initial assessment performed. The patients presenting problems have been discussed, and they are in agreement with the care plan formulated and outlined with them. I have encouraged them to ask questions as they arise throughout their visit. Improved. No vomiting. Tolerated p.o. well. PROCEDURES      Disposition: Condition stable   DC- Adult Discharges: All of the diagnostic tests were reviewed and questions answered. Diagnosis, care plan and treatment options were discussed. understand instructions and will follow up as directed. The patients results have been reviewed with them. They have been counseled regarding their diagnosis. The patient verbally convey understanding and agreement of the signs, symptoms, diagnosis, treatment and prognosis and additionally agrees to follow up as recommended. They also agree with the care-plan and convey that all of their questions have been answered. I have also put together some discharge instructions for them that include: 1) educational information regarding their diagnosis, 2) how to care for their diagnosis at home, as well a 3) list of reasons why they would want to return to the ED prior to their follow-up appointment, should their condition change. PLAN:  1. Current Discharge Medication List        START taking these medications    Details   ondansetron hcl (Zofran) 4 mg tablet Take 1 Tablet by mouth every eight (8) hours as needed for Nausea or Vomiting for up to 4 days.   Qty: 12 Tablet, Refills: 0  Start date: 9/28/2022, End date: 10/2/2022 omeprazole (PRILOSEC) 20 mg capsule Take 1 Capsule by mouth daily for 14 days. Qty: 14 Capsule, Refills: 0  Start date: 9/28/2022, End date: 10/12/2022           2. Follow-up Information       Follow up With Specialties Details Why Contact Info    Follow up with your primary care physician  Schedule an appointment as soon as possible for a visit in 3 days As needed           Return to ED if worse     Diagnosis     Clinical Impression:   1. Nausea and vomiting, unspecified vomiting type    2. Gastritis and duodenitis        Please note that this dictation was completed with [employer], the [employer] voice recognition software. Quite often unanticipated grammatical, syntax, homophones, and other interpretive errors are inadvertently transcribed by the computer software. Please disregard these errors. Please excuse any errors that have escaped final proofreading. Thank you.

## 2023-01-17 ENCOUNTER — HOSPITAL ENCOUNTER (EMERGENCY)
Age: 28
Discharge: HOME | End: 2023-01-17
Attending: EMERGENCY MEDICINE
Payer: MEDICAID

## 2023-01-17 VITALS
OXYGEN SATURATION: 100 % | DIASTOLIC BLOOD PRESSURE: 98 MMHG | SYSTOLIC BLOOD PRESSURE: 147 MMHG | HEART RATE: 104 BPM | RESPIRATION RATE: 22 BRPM | WEIGHT: 237 LBS | BODY MASS INDEX: 33.18 KG/M2 | TEMPERATURE: 98.7 F | HEIGHT: 71 IN

## 2023-01-17 DIAGNOSIS — Z87.11 H/O GASTRIC ULCER: ICD-10-CM

## 2023-01-17 DIAGNOSIS — K52.9 GASTROENTERITIS, ACUTE: Primary | ICD-10-CM

## 2023-01-17 LAB
ALBUMIN SERPL-MCNC: 4.1 G/DL (ref 3.5–5)
ALBUMIN/GLOB SERPL: 1.1 (ref 1.1–2.2)
ALP SERPL-CCNC: 89 U/L (ref 45–117)
ALT SERPL-CCNC: 69 U/L (ref 12–78)
AMPHET UR QL SCN: NEGATIVE
ANION GAP SERPL CALC-SCNC: 11 MMOL/L (ref 5–15)
APPEARANCE UR: CLEAR
AST SERPL W P-5'-P-CCNC: 82 U/L (ref 15–37)
BACTERIA URNS QL MICRO: NEGATIVE /HPF
BARBITURATES UR QL SCN: NEGATIVE
BASOPHILS # BLD: 0.1 K/UL (ref 0–0.1)
BASOPHILS NFR BLD: 1 % (ref 0–1)
BENZODIAZ UR QL: NEGATIVE
BILIRUB SERPL-MCNC: 0.4 MG/DL (ref 0.2–1)
BILIRUB UR QL: NEGATIVE
BUN SERPL-MCNC: 14 MG/DL (ref 6–20)
BUN/CREAT SERPL: 11 (ref 12–20)
BUPRENORPHINE UR QL: NEGATIVE
CA-I BLD-MCNC: 9 MG/DL (ref 8.5–10.1)
CANNABINOIDS UR QL SCN: NEGATIVE
CHLORIDE SERPL-SCNC: 101 MMOL/L (ref 97–108)
CO2 SERPL-SCNC: 27 MMOL/L (ref 21–32)
COCAINE UR QL SCN: NEGATIVE
COLLECT DATE STL: NEGATIVE
COLOR UR: ABNORMAL
CREAT SERPL-MCNC: 1.22 MG/DL (ref 0.7–1.3)
DIFFERENTIAL METHOD BLD: NORMAL
DRUG SCRN COMMENT,DRGCM: NORMAL
EOSINOPHIL # BLD: 0.1 K/UL (ref 0–0.4)
EOSINOPHIL NFR BLD: 1 % (ref 0–7)
EPITH CASTS URNS QL MICRO: ABNORMAL /LPF
ERYTHROCYTE [DISTWIDTH] IN BLOOD BY AUTOMATED COUNT: 12.7 % (ref 11.5–14.5)
GLOBULIN SER CALC-MCNC: 3.9 G/DL (ref 2–4)
GLUCOSE SERPL-MCNC: 119 MG/DL (ref 65–100)
GLUCOSE UR STRIP.AUTO-MCNC: NEGATIVE MG/DL
HCT VFR BLD AUTO: 43.2 % (ref 36.6–50.3)
HEMOCCULT SP1 STL QL: NEGATIVE
HGB BLD-MCNC: 15 G/DL (ref 12.1–17)
HGB UR QL STRIP: NEGATIVE
IMM GRANULOCYTES # BLD AUTO: 0 K/UL (ref 0–0.04)
IMM GRANULOCYTES NFR BLD AUTO: 0 % (ref 0–0.5)
KETONES UR QL STRIP.AUTO: 15 MG/DL
LEUKOCYTE ESTERASE UR QL STRIP.AUTO: NEGATIVE
LIPASE SERPL-CCNC: 145 U/L (ref 73–393)
LYMPHOCYTES # BLD: 3.3 K/UL (ref 0.8–3.5)
LYMPHOCYTES NFR BLD: 41 % (ref 12–49)
MCH RBC QN AUTO: 32.6 PG (ref 26–34)
MCHC RBC AUTO-ENTMCNC: 34.7 G/DL (ref 30–36.5)
MCV RBC AUTO: 93.9 FL (ref 80–99)
METHADONE UR QL: NEGATIVE
METHAMPHET UR QL: NEGATIVE
MONOCYTES # BLD: 0.7 K/UL (ref 0–1)
MONOCYTES NFR BLD: 8 % (ref 5–13)
NEUTS SEG # BLD: 4.1 K/UL (ref 1.8–8)
NEUTS SEG NFR BLD: 49 % (ref 32–75)
NITRITE UR QL STRIP.AUTO: NEGATIVE
OPIATES UR QL: NEGATIVE
OXYCODONE UR QL SCN: NEGATIVE
PCP UR QL: NEGATIVE
PH UR STRIP: 6 (ref 5–8)
PLATELET # BLD AUTO: 282 K/UL (ref 150–400)
PMV BLD AUTO: 9 FL (ref 8.9–12.9)
POTASSIUM SERPL-SCNC: 4.2 MMOL/L (ref 3.5–5.1)
PROPOXYPH UR QL: NEGATIVE
PROT SERPL-MCNC: 8 G/DL (ref 6.4–8.2)
PROT UR STRIP-MCNC: ABNORMAL MG/DL
RBC # BLD AUTO: 4.6 M/UL (ref 4.1–5.7)
RBC #/AREA URNS HPF: ABNORMAL /HPF (ref 0–5)
SODIUM SERPL-SCNC: 139 MMOL/L (ref 136–145)
SP GR UR REFRACTOMETRY: 1.02 (ref 1–1.03)
TRICYCLICS UR QL: NEGATIVE
UA: UC IF INDICATED,UAUC: ABNORMAL
UROBILINOGEN UR QL STRIP.AUTO: 0.1 EU/DL (ref 0.2–1)
WBC # BLD AUTO: 8.2 K/UL (ref 4.1–11.1)
WBC URNS QL MICRO: ABNORMAL /HPF (ref 0–4)

## 2023-01-17 PROCEDURE — 85025 COMPLETE CBC W/AUTO DIFF WBC: CPT

## 2023-01-17 PROCEDURE — 74011000258 HC RX REV CODE- 258: Performed by: EMERGENCY MEDICINE

## 2023-01-17 PROCEDURE — 96361 HYDRATE IV INFUSION ADD-ON: CPT

## 2023-01-17 PROCEDURE — 83690 ASSAY OF LIPASE: CPT

## 2023-01-17 PROCEDURE — 81001 URINALYSIS AUTO W/SCOPE: CPT

## 2023-01-17 PROCEDURE — 74011250636 HC RX REV CODE- 250/636: Performed by: EMERGENCY MEDICINE

## 2023-01-17 PROCEDURE — 99284 EMERGENCY DEPT VISIT MOD MDM: CPT

## 2023-01-17 PROCEDURE — 80307 DRUG TEST PRSMV CHEM ANLYZR: CPT

## 2023-01-17 PROCEDURE — 80053 COMPREHEN METABOLIC PANEL: CPT

## 2023-01-17 PROCEDURE — 36415 COLL VENOUS BLD VENIPUNCTURE: CPT

## 2023-01-17 PROCEDURE — 96374 THER/PROPH/DIAG INJ IV PUSH: CPT

## 2023-01-17 PROCEDURE — 82272 OCCULT BLD FECES 1-3 TESTS: CPT

## 2023-01-17 PROCEDURE — 96375 TX/PRO/DX INJ NEW DRUG ADDON: CPT

## 2023-01-17 RX ORDER — OMEPRAZOLE 20 MG/1
20 TABLET, DELAYED RELEASE ORAL DAILY
COMMUNITY

## 2023-01-17 RX ORDER — LORAZEPAM 2 MG/ML
1 INJECTION INTRAMUSCULAR
Status: COMPLETED | OUTPATIENT
Start: 2023-01-17 | End: 2023-01-17

## 2023-01-17 RX ORDER — ONDANSETRON 4 MG/1
4 TABLET, FILM COATED ORAL
Qty: 20 TABLET | Refills: 0 | Status: SHIPPED | OUTPATIENT
Start: 2023-01-17

## 2023-01-17 RX ORDER — ONDANSETRON 4 MG/1
4 TABLET, ORALLY DISINTEGRATING ORAL
Status: DISCONTINUED | OUTPATIENT
Start: 2023-01-17 | End: 2023-01-17

## 2023-01-17 RX ORDER — HALOPERIDOL 5 MG/ML
5 INJECTION INTRAMUSCULAR ONCE
Status: COMPLETED | OUTPATIENT
Start: 2023-01-17 | End: 2023-01-17

## 2023-01-17 RX ORDER — ESCITALOPRAM OXALATE 10 MG/1
10 TABLET ORAL DAILY
COMMUNITY

## 2023-01-17 RX ADMIN — HALOPERIDOL LACTATE 5 MG: 5 INJECTION, SOLUTION INTRAMUSCULAR at 10:03

## 2023-01-17 RX ADMIN — SODIUM CHLORIDE 1000 ML: 9 INJECTION, SOLUTION INTRAVENOUS at 10:04

## 2023-01-17 RX ADMIN — FAMOTIDINE 20 MG: 10 INJECTION, SOLUTION INTRAVENOUS at 10:03

## 2023-01-17 RX ADMIN — LORAZEPAM 1 MG: 2 INJECTION INTRAMUSCULAR; INTRAVENOUS at 10:02

## 2023-01-17 NOTE — ED TRIAGE NOTES
Pt reports V/D x4 days, approx 1 hour ago started with bloody V/D with generalized abdominal pain.   Hx of vomiting blood, states he has an ruptured ulcer

## 2023-01-17 NOTE — DISCHARGE INSTRUCTIONS
Thank you! Thank you for allowing me to care for you in the emergency department. It is my goal to provide you with excellent care. If you have not received excellent quality care, please ask to speak to the nurse manager. Please fill out the survey that will come to you by mail or email since we listen to your feedback! Below you will find a list of your tests from today's visit. Should you have any questions, please do not hesitate to call the emergency department. Labs  Recent Results (from the past 12 hour(s))   CBC WITH AUTOMATED DIFF    Collection Time: 01/17/23 10:00 AM   Result Value Ref Range    WBC 8.2 4.1 - 11.1 K/uL    RBC 4.60 4.10 - 5.70 M/uL    HGB 15.0 12.1 - 17.0 g/dL    HCT 43.2 36.6 - 50.3 %    MCV 93.9 80.0 - 99.0 FL    MCH 32.6 26.0 - 34.0 PG    MCHC 34.7 30.0 - 36.5 g/dL    RDW 12.7 11.5 - 14.5 %    PLATELET 826 127 - 056 K/uL    MPV 9.0 8.9 - 12.9 FL    NEUTROPHILS 49 32 - 75 %    LYMPHOCYTES 41 12 - 49 %    MONOCYTES 8 5 - 13 %    EOSINOPHILS 1 0 - 7 %    BASOPHILS 1 0 - 1 %    IMMATURE GRANULOCYTES 0 0.0 - 0.5 %    ABS. NEUTROPHILS 4.1 1.8 - 8.0 K/UL    ABS. LYMPHOCYTES 3.3 0.8 - 3.5 K/UL    ABS. MONOCYTES 0.7 0.0 - 1.0 K/UL    ABS. EOSINOPHILS 0.1 0.0 - 0.4 K/UL    ABS. BASOPHILS 0.1 0.0 - 0.1 K/UL    ABS. IMM. GRANS. 0.0 0.00 - 0.04 K/UL    DF AUTOMATED     METABOLIC PANEL, COMPREHENSIVE    Collection Time: 01/17/23 10:00 AM   Result Value Ref Range    Sodium 139 136 - 145 mmol/L    Potassium 4.2 3.5 - 5.1 mmol/L    Chloride 101 97 - 108 mmol/L    CO2 27 21 - 32 mmol/L    Anion gap 11 5 - 15 mmol/L    Glucose 119 (H) 65 - 100 mg/dL    BUN 14 6 - 20 mg/dL    Creatinine 1.22 0.70 - 1.30 mg/dL    BUN/Creatinine ratio 11 (L) 12 - 20      eGFR >60 >60 ml/min/1.73m2    Calcium 9.0 8.5 - 10.1 mg/dL    Bilirubin, total 0.4 0.2 - 1.0 mg/dL    AST (SGOT) 82 (H) 15 - 37 U/L    ALT (SGPT) 69 12 - 78 U/L    Alk.  phosphatase 89 45 - 117 U/L    Protein, total 8.0 6.4 - 8.2 g/dL    Albumin 4.1 3.5 - 5.0 g/dL    Globulin 3.9 2.0 - 4.0 g/dL    A-G Ratio 1.1 1.1 - 2.2     LIPASE    Collection Time: 01/17/23 10:00 AM   Result Value Ref Range    Lipase 145 73 - 393 U/L   URINALYSIS W/ REFLEX CULTURE    Collection Time: 01/17/23 10:30 AM    Specimen: Urine   Result Value Ref Range    Color Yellow/Straw      Appearance Clear Clear      Specific gravity 1.020 1.003 - 1.030      pH (UA) 6.0 5.0 - 8.0      Protein Trace (A) Negative mg/dL    Glucose Negative Negative mg/dL    Ketone 15 (A) Negative mg/dL    Bilirubin Negative Negative      Blood Negative Negative      Urobilinogen 0.1 (L) 0.2 - 1.0 EU/dL    Nitrites Negative Negative      Leukocyte Esterase Negative Negative      WBC 0-4 0 - 4 /hpf    RBC 0-5 0 - 5 /hpf    Epithelial cells Few Few /lpf    Bacteria Negative Negative /hpf    UA:UC IF INDICATED Culture not indicated by UA result Culture not indicated by UA result     DRUG SCREEN, URINE    Collection Time: 01/17/23 10:30 AM   Result Value Ref Range    AMPHETAMINES Negative Negative      BARBITURATES Negative Negative      Buprenorphine screen, urine Negative Negative      BENZODIAZEPINES Negative Negative      COCAINE Negative Negative      METHADONE Negative Negative      Methamphetamines Negative Negative      OPIATES Negative Negative      OXYCODONE SCREEN Negative Negative      PCP(PHENCYCLIDINE) Negative Negative      PROPOXYPHENE Negative Negative      THC (TH-CANNABINOL) Negative Negative      TRICYCLICS Negative Negative      Drug screen comment       OCCULT BLOOD, STOOL    Collection Time: 01/17/23 10:30 AM   Result Value Ref Range    Occult Blood,day 1 Negative Negative      Day 1 date: Negative         Radiologic Studies  No orders to display     CT Results  (Last 48 hours)      None          CXR Results  (Last 48 hours)      None          ------------------------------------------------------------------------------------------------------------  The exam and treatment you received in the Emergency Department were for an urgent problem and are not intended as complete care. It is important that you follow-up with a doctor, nurse practitioner, or physician assistant to:  (1) confirm your diagnosis,  (2) re-evaluation of changes in your illness and treatment, and  (3) for ongoing care. Please take your discharge instructions with you when you go to your follow-up appointment. If you have any problem arranging a follow-up appointment, contact the Emergency Department. If your symptoms become worse or you do not improve as expected and you are unable to reach your health care provider, please return to the Emergency Department. We are available 24 hours a day. If a prescription has been provided, please have it filled as soon as possible to prevent a delay in treatment. If you have any questions or reservations about taking the medication due to side effects or interactions with other medications, please call your primary care provider or contact the ER.

## 2023-01-17 NOTE — Clinical Note
Taniya Gallagher was seen and treated in our emergency department on 1/17/2023.     PT SHOULD NOT RETURN TO WORK UNTIL CLEARED BY HIS GI DOCTOR NEXT WEEK    Lila Silverio MD

## 2023-01-18 NOTE — ED PROVIDER NOTES
[facility] EMERGENCY DEPARTMENT  EMERGENCY DEPARTMENT HISTORY AND PHYSICAL EXAM      Date: 2023  Patient Name: Wallace Jeong  MRN: [id number]  YOB: 1995  Date of evaluation: 2023  Provider: Lou Kc MD   Note Started: 1:14 PM 23    HISTORY OF PRESENT ILLNESS     Chief Complaint   Patient presents with    Vomiting    Diarrhea       History Provided By: Patient    HPI: Wallace Jeong, 28 y.o. male with recent hospitalization at [facility] for gastric ulcer that pt reports had bleed, presents to ED with complaints of NVD and abd pain. He has continued to notice some blood in his vomit. He has been vomiting and had diarrhea for four days since discharge. Yesterday noticed some blood in his vomit and stool. He has generalized abd pain but focused on epigastric region. It is constant now but worsens with vomiting. Not eating seems to help pain but he does state he likes spicy foods which make it worse. He endorses taking medications provided. He had an EGD at OSH which showed bleeding ulcers. He has scheduled follow. PAST MEDICAL HISTORY   Past Medical History:  Past Medical History:   Diagnosis Date    ADHD     Anxiety     Bipolar 1 disorder ([facility] [address]     Multiple personalities ([facility] [address]     Stomach ulcer        Past Surgical History:  History reviewed. No pertinent surgical history. Family History:  History reviewed. No pertinent family history. Social History:  Social History     Tobacco Use    Smoking status: Former     Types: Cigarettes     Quit date: 2023     Years since quittin.0    Smokeless tobacco: Never   Substance Use Topics    Alcohol use: Yes     Comment: drinks on weekends    Drug use: Not Currently     Types: Marijuana     Comment: 8 months sober       Allergies:   Allergies   Allergen Reactions    Erythromycin Nausea and Vomiting    Morphine Other (comments)     Patient reports hallucinations       PCP: Gagan Duran MD    Current Meds:   Discharge Medication List as of 1/17/2023  1:22 PM        CONTINUE these medications which have NOT CHANGED    Details   omeprazole (PriLOSEC OTC) 20 mg tablet Take 20 mg by mouth daily. , Historical Med      escitalopram oxalate (Lexapro) 10 mg tablet Take 10 mg by mouth daily. , Historical Med      ondansetron (Zofran ODT) 4 mg disintegrating tablet Take 1 Tab by mouth every eight (8) hours as needed for Nausea. , Print, Disp-12 Tab, R-0             REVIEW OF SYSTEMS   Review of Systems   Constitutional:  Negative for chills and fever. HENT: Negative. Negative for congestion, rhinorrhea, sneezing and sore throat. Eyes: Negative. Negative for redness and visual disturbance. Respiratory: Negative. Negative for cough, shortness of breath and wheezing. Cardiovascular: Negative. Negative for chest pain and leg swelling. Gastrointestinal:  Positive for abdominal pain, blood in stool, diarrhea, nausea and vomiting. Genitourinary: Negative. Negative for difficulty urinating and frequency. Musculoskeletal: Negative. Negative for arthralgias, back pain, myalgias and neck stiffness. Skin: Negative. Negative for color change and rash. Neurological: Negative. Negative for dizziness, syncope, weakness, numbness and headaches. Hematological:  Negative for adenopathy. Psychiatric/Behavioral: Negative. All other systems reviewed and are negative. Positives and Pertinent negatives as per HPI. PHYSICAL EXAM     ED Triage Vitals [01/17/23 0951]   ED Encounter Vitals Group      BP (!) 147/98      Pulse (Heart Rate) (!) 104      Resp Rate 22      Temp 98.7 °F (37.1 °C)      Temp src       O2 Sat (%) 100 %      Weight 237 lb      Height 5' 11\"      Physical Exam  Vitals and nursing note reviewed. Constitutional:       Comments: Actively vomiting on arrival   HENT:      Head: Atraumatic. Cardiovascular:      Rate and Rhythm: Normal rate and regular rhythm.    Pulmonary:      Effort: Pulmonary effort is normal.      Breath sounds: Normal breath sounds. Chest:      Chest wall: Tenderness present. Abdominal:      General: Bowel sounds are normal. There is no distension. Palpations: Abdomen is soft. There is no mass. Tenderness: There is abdominal tenderness. There is no guarding or rebound. Musculoskeletal:         General: No tenderness. Normal range of motion. Neurological:      Mental Status: He is oriented to person, place, and time.        SCREENINGS               No data recorded        LAB, EKG AND DIAGNOSTIC RESULTS   Labs:New Results - Labs    Updated   Order    01/17/23 1057  DRUG SCREEN, URINE   Collected: 01/17/23 1030  Final result  Specimen: Urine, random     AMPHETAMINES Negative     BARBITURATES Negative     Buprenorphine screen, urine Negative     BENZODIAZEPINES Negative     COCAINE Negative     METHADONE Negative     Methamphetamines Negative     OPIATES Negative     OXYCODONE SCREEN Negative     PCP(PHENCYCLIDINE) Negative     PROPOXYPHENE Negative     THC (TH-CANNABINOL) Negative     TRICYCLICS Negative     Drug screen comment               01/17/23 1054  URINALYSIS W/ REFLEX CULTURE   Collected: 01/17/23 1030  Final result  Specimen: Urine     Color Yellow/Straw      Appearance Clear     Specific gravity 1.020     pH (UA) 6.0     Protein Trace Abnormal  mg/dL   Glucose Negative mg/dL   Ketone 15 Abnormal  mg/dL   Bilirubin Negative     Blood Negative     Urobilinogen 0.1 Low  EU/dL   Nitrites Negative     Leukocyte Esterase Negative     WBC 0-4 /hpf   RBC 0-5 /hpf   Epithelial cells Few /lpf    Bacteria Negative /hpf   UA:UC IF INDICATED Culture not indicated by UA result            01/17/23 1041  OCCULT BLOOD, STOOL   Collected: 01/17/23 1030  Final result  Specimen: Stool     Occult Blood,day 1 Negative     Day 1 date: Negative            01/17/23 1023  LIPASE   Collected: 01/17/23 1000  Final result  Specimen: Serum or Plasma     Lipase 145 U/L          01/17/23 1023 METABOLIC PANEL, COMPREHENSIVE   Collected: 01/17/23 1000  Final result  Specimen: Serum or Plasma     Sodium 139 mmol/L   Potassium 4.2 mmol/L   Chloride 101 mmol/L   CO2 27 mmol/L   Anion gap 11 mmol/L   Glucose 119 High  mg/dL   BUN 14 mg/dL   Creatinine 1.22 mg/dL   BUN/Creatinine ratio 11 Low      eGFR >60 ml/min/1.73m2    Calcium 9.0 mg/dL   Bilirubin, total 0.4 mg/dL   AST (SGOT) 82 High  U/L   ALT (SGPT) 69 U/L   Alk. phosphatase 89 U/L   Protein, total 8.0 g/dL   Albumin 4.1 g/dL   Globulin 3.9 g/dL   A-G Ratio 1.1            01/17/23 1011  CBC WITH AUTOMATED DIFF   Collected: 01/17/23 1000  Final result  Specimen: Whole Blood     WBC 8.2 K/uL   RBC 4.60 M/uL   HGB 15.0 g/dL   HCT 43.2 %   MCV 93.9 FL   MCH 32.6 PG   MCHC 34.7 g/dL   RDW 12.7 %   PLATELET 734 K/uL   MPV 9.0 FL   NEUTROPHILS 49 %   LYMPHOCYTES 41 %   MONOCYTES 8 %   EOSINOPHILS 1 %   BASOPHILS 1 %   IMMATURE GRANULOCYTES 0 %   ABS. NEUTROPHILS 4.1 K/UL   ABS. LYMPHOCYTES 3.3 K/UL   ABS. MONOCYTES 0.7 K/UL   ABS. EOSINOPHILS 0.1 K/UL   ABS. BASOPHILS 0.1 K/UL   ABS. IMM. GRANS. 0.0 K/UL   DF AUTOMATED              Radiologic Studies:  Non-plain film images such as CT, Ultrasound and MRI are read by the radiologist. Plain radiographic images are visualized and preliminarily interpreted by the ED Provider with the below findings:    **    Interpretation per the Radiologist below, if available at the time of this note:  No results found. PROCEDURES   Unless otherwise noted below, none.   Performed by: Deanne Reyes MD   Procedures      CRITICAL CARE TIME       ED COURSE and DIFFERENTIAL DIAGNOSIS/MDM   Vitals:    Vitals:    01/17/23 0951   BP: (!) 147/98   Pulse: (!) 104   Resp: 22   Temp: 98.7 °F (37.1 °C)   SpO2: 100%   Weight: 107.5 kg (237 lb)   Height: 5' 11\" (1.803 m)        Patient was given the following medications:  Medications   sodium chloride 0.9 % bolus infusion 1,000 mL (0 mL IntraVENous IV Completed 1/17/23 4215) haloperidol lactate (HALDOL) injection 5 mg (5 mg IntraVENous Given 1/17/23 1003)   LORazepam (ATIVAN) injection 1 mg (1 mg IntraVENous Given 1/17/23 1002)       CONSULTS: (Who and What was discussed)  None     Chronic Conditions:   Social Determinants affecting Dx or Tx: None  Counseling:     Records Reviewed (source and summary of external notes): Nursing Notes    CC/HPI Summary, DDx, ED Course, and Reassessment: Pt has ongoing vomiting will treat aggressively as this will only aggravate his gastric ulcer and lend itself to further bleeding. Will treat with famotidine, IVF and obtain labs to assess hemoglobin. Will send stool to assess for blood. Pt's VSS without signs of acute blood loss anemia. Will attempt to control symptoms and if stable and hemglobin stable may be able to return home as pt has had recent full work up for the above and is tied into appropriate follow-up with GI. Gastric ulcer, gastroenteritis, acute blood loss anemia, dehydration, electrolyte abnl,    ED Course as of 01/18/23 1314   e Jan 17, 2023   1027 Pt significant;y improved NV after IV  haldol. No further vomiting. [LG]      ED Course User Index  [LG] Kip Torres MD       Disposition Considerations (Tests not done, Shared Decision Making, Pt Expectation of Test or Treatment.): CT scan will not likely add to patients diagnosis. He has had EGD. He is in agreement about no further testing if hemoglobin stable and vomiting and diarrhea improved. FINAL IMPRESSION     1. Gastroenteritis, acute    2. H/O gastric ulcer          DISPOSITION/PLAN   Discharged         PATIENT REFERRED TO:  Follow-up Information       Follow up With Specialties Details Why Contact Info        CALL YOUR GI DOCTOR TO INFORM THEM OF YOUR NAUSEA AND VOMITING. YOUR HEMOGLOBIN WAS STABLE AT 15 TODAY.     [facility] Emergency Medicine  As needed, If symptoms worsen [address]  [phone] DISCHARGE MEDICATIONS:  Discharge Medication List as of 1/17/2023  1:22 PM        START taking these medications    Details   ondansetron hcl (Zofran) 4 mg tablet Take 1 Tablet by mouth every eight (8) hours as needed for Nausea., Normal, Disp-20 Tablet, R-0           CONTINUE these medications which have NOT CHANGED    Details   omeprazole (PriLOSEC OTC) 20 mg tablet Take 20 mg by mouth daily. , Historical Med      escitalopram oxalate (Lexapro) 10 mg tablet Take 10 mg by mouth daily. , Historical Med      ondansetron (Zofran ODT) 4 mg disintegrating tablet Take 1 Tab by mouth every eight (8) hours as needed for Nausea. , Print, Disp-12 Tab, R-0               DISCONTINUED MEDICATIONS:  Discharge Medication List as of 1/17/2023  1:22 PM          I am the Primary Clinician of Record: Aleja Banks MD (electronically signed)    (Please note that parts of this dictation were completed with voice recognition software. Quite often unanticipated grammatical, syntax, homophones, and other interpretive errors are inadvertently transcribed by the computer software. Please disregards these errors.  Please excuse any errors that have escaped final proofreading.)

## 2023-01-23 ENCOUNTER — HOSPITAL ENCOUNTER (EMERGENCY)
Age: 28
Discharge: HOME | End: 2023-01-23
Attending: STUDENT IN AN ORGANIZED HEALTH CARE EDUCATION/TRAINING PROGRAM
Payer: MEDICAID

## 2023-01-23 VITALS
DIASTOLIC BLOOD PRESSURE: 102 MMHG | TEMPERATURE: 98.6 F | HEART RATE: 98 BPM | BODY MASS INDEX: 31.83 KG/M2 | SYSTOLIC BLOOD PRESSURE: 140 MMHG | RESPIRATION RATE: 18 BRPM | HEIGHT: 72 IN | WEIGHT: 235 LBS | OXYGEN SATURATION: 98 %

## 2023-01-23 DIAGNOSIS — R11.2 NAUSEA AND VOMITING, UNSPECIFIED VOMITING TYPE: Primary | ICD-10-CM

## 2023-01-23 LAB
ALBUMIN SERPL-MCNC: 4.1 G/DL (ref 3.5–5)
ALBUMIN/GLOB SERPL: 1 (ref 1.1–2.2)
ALP SERPL-CCNC: 82 U/L (ref 45–117)
ALT SERPL-CCNC: 62 U/L (ref 12–78)
ANION GAP SERPL CALC-SCNC: 10 MMOL/L (ref 5–15)
AST SERPL W P-5'-P-CCNC: 108 U/L (ref 15–37)
BILIRUB SERPL-MCNC: 0.9 MG/DL (ref 0.2–1)
BUN SERPL-MCNC: 13 MG/DL (ref 6–20)
BUN/CREAT SERPL: 13 (ref 12–20)
CA-I BLD-MCNC: 9.2 MG/DL (ref 8.5–10.1)
CHLORIDE SERPL-SCNC: 100 MMOL/L (ref 97–108)
CO2 SERPL-SCNC: 26 MMOL/L (ref 21–32)
CREAT SERPL-MCNC: 1 MG/DL (ref 0.7–1.3)
ERYTHROCYTE [DISTWIDTH] IN BLOOD BY AUTOMATED COUNT: 12.5 % (ref 11.5–14.5)
GLOBULIN SER CALC-MCNC: 4 G/DL (ref 2–4)
GLUCOSE SERPL-MCNC: 96 MG/DL (ref 65–100)
HCT VFR BLD AUTO: 42 % (ref 36.6–50.3)
HGB BLD-MCNC: 14.5 G/DL (ref 12.1–17)
LIPASE SERPL-CCNC: 158 U/L (ref 73–393)
MCH RBC QN AUTO: 32.2 PG (ref 26–34)
MCHC RBC AUTO-ENTMCNC: 34.5 G/DL (ref 30–36.5)
MCV RBC AUTO: 93.1 FL (ref 80–99)
NRBC # BLD: 0 K/UL (ref 0–0.01)
NRBC BLD-RTO: 0 PER 100 WBC
PLATELET # BLD AUTO: 266 K/UL (ref 150–400)
PMV BLD AUTO: 9.1 FL (ref 8.9–12.9)
POTASSIUM SERPL-SCNC: 3.7 MMOL/L (ref 3.5–5.1)
PROT SERPL-MCNC: 8.1 G/DL (ref 6.4–8.2)
RBC # BLD AUTO: 4.51 M/UL (ref 4.1–5.7)
SODIUM SERPL-SCNC: 136 MMOL/L (ref 136–145)
WBC # BLD AUTO: 9 K/UL (ref 4.1–11.1)

## 2023-01-23 PROCEDURE — 36415 COLL VENOUS BLD VENIPUNCTURE: CPT

## 2023-01-23 PROCEDURE — 99284 EMERGENCY DEPT VISIT MOD MDM: CPT

## 2023-01-23 PROCEDURE — 96374 THER/PROPH/DIAG INJ IV PUSH: CPT

## 2023-01-23 PROCEDURE — 83690 ASSAY OF LIPASE: CPT

## 2023-01-23 PROCEDURE — 80053 COMPREHEN METABOLIC PANEL: CPT

## 2023-01-23 PROCEDURE — 74011250636 HC RX REV CODE- 250/636: Performed by: STUDENT IN AN ORGANIZED HEALTH CARE EDUCATION/TRAINING PROGRAM

## 2023-01-23 PROCEDURE — 85027 COMPLETE CBC AUTOMATED: CPT

## 2023-01-23 RX ORDER — PANTOPRAZOLE SODIUM 40 MG/1
40 TABLET, DELAYED RELEASE ORAL DAILY
Qty: 30 TABLET | Refills: 0 | Status: SHIPPED | OUTPATIENT
Start: 2023-01-23

## 2023-01-23 RX ORDER — METOCLOPRAMIDE 5 MG/1
5 TABLET ORAL
Qty: 12 TABLET | Refills: 0 | Status: SHIPPED | OUTPATIENT
Start: 2023-01-23 | End: 2023-02-02

## 2023-01-23 RX ORDER — METOCLOPRAMIDE HYDROCHLORIDE 5 MG/ML
5 INJECTION INTRAMUSCULAR; INTRAVENOUS
Status: COMPLETED | OUTPATIENT
Start: 2023-01-23 | End: 2023-01-23

## 2023-01-23 RX ADMIN — METOCLOPRAMIDE 5 MG: 5 INJECTION, SOLUTION INTRAMUSCULAR; INTRAVENOUS at 13:16

## 2023-01-23 NOTE — ED TRIAGE NOTES
Has been having nausea, and vomiting for three weeks. Seen at other facilities, just given nausea medication, no relief. Blood word did not show anything. Pain above and below umbilicus.  Prince, int.

## 2023-08-16 ENCOUNTER — APPOINTMENT (OUTPATIENT)
Facility: HOSPITAL | Age: 28
End: 2023-08-16
Payer: MEDICAID

## 2023-08-16 ENCOUNTER — HOSPITAL ENCOUNTER (EMERGENCY)
Facility: HOSPITAL | Age: 28
Discharge: HOME | End: 2023-08-16
Attending: STUDENT IN AN ORGANIZED HEALTH CARE EDUCATION/TRAINING PROGRAM
Payer: MEDICAID

## 2023-08-16 VITALS
RESPIRATION RATE: 18 BRPM | WEIGHT: 228 LBS | TEMPERATURE: 98.7 F | DIASTOLIC BLOOD PRESSURE: 80 MMHG | OXYGEN SATURATION: 100 % | SYSTOLIC BLOOD PRESSURE: 126 MMHG | BODY MASS INDEX: 30.92 KG/M2 | HEART RATE: 94 BPM

## 2023-08-16 DIAGNOSIS — I48.91 ATRIAL FIBRILLATION, UNSPECIFIED TYPE (HCC): Primary | ICD-10-CM

## 2023-08-16 LAB
ALBUMIN SERPL-MCNC: 4.2 G/DL (ref 3.5–5)
ALBUMIN/GLOB SERPL: 0.8 (ref 1.1–2.2)
ALP SERPL-CCNC: 58 U/L (ref 45–117)
ALT SERPL-CCNC: 65 U/L (ref 12–78)
ANION GAP SERPL CALC-SCNC: 15 MMOL/L (ref 5–15)
APPEARANCE UR: CLEAR
AST SERPL W P-5'-P-CCNC: 110 U/L (ref 15–37)
BACTERIA URNS QL MICRO: NEGATIVE /HPF
BASOPHILS # BLD: 0.1 K/UL (ref 0–0.1)
BASOPHILS NFR BLD: 1 % (ref 0–1)
BILIRUB SERPL-MCNC: 1 MG/DL (ref 0.2–1)
BILIRUB UR QL: NEGATIVE
BUN SERPL-MCNC: 26 MG/DL (ref 6–20)
BUN/CREAT SERPL: 25 (ref 12–20)
CA-I BLD-MCNC: 9.9 MG/DL (ref 8.5–10.1)
CHLORIDE SERPL-SCNC: 95 MMOL/L (ref 97–108)
CK SERPL-CCNC: 786 U/L (ref 39–308)
CO2 SERPL-SCNC: 25 MMOL/L (ref 21–32)
COLOR UR: YELLOW
CREAT SERPL-MCNC: 1.04 MG/DL (ref 0.7–1.3)
DIFFERENTIAL METHOD BLD: ABNORMAL
EOSINOPHIL # BLD: 0.1 K/UL (ref 0–0.4)
EOSINOPHIL NFR BLD: 1 % (ref 0–7)
EPITH CASTS URNS QL MICRO: ABNORMAL /LPF
ERYTHROCYTE [DISTWIDTH] IN BLOOD BY AUTOMATED COUNT: 12.7 % (ref 11.5–14.5)
GLOBULIN SER CALC-MCNC: 5.3 G/DL (ref 2–4)
GLUCOSE SERPL-MCNC: 107 MG/DL (ref 65–100)
GLUCOSE UR STRIP.AUTO-MCNC: NEGATIVE MG/DL
HCT VFR BLD AUTO: 45.8 % (ref 36.6–50.3)
HGB BLD-MCNC: 16.1 G/DL (ref 12.1–17)
HGB UR QL STRIP: NEGATIVE
IMM GRANULOCYTES # BLD AUTO: 0.1 K/UL (ref 0–0.04)
IMM GRANULOCYTES NFR BLD AUTO: 0 % (ref 0–0.5)
KETONES UR QL STRIP.AUTO: 50 MG/DL
LEUKOCYTE ESTERASE UR QL STRIP.AUTO: NEGATIVE
LYMPHOCYTES # BLD: 2.2 K/UL (ref 0.8–3.5)
LYMPHOCYTES NFR BLD: 19 % (ref 12–49)
MAGNESIUM SERPL-MCNC: 2.1 MG/DL (ref 1.6–2.4)
MCH RBC QN AUTO: 33.9 PG (ref 26–34)
MCHC RBC AUTO-ENTMCNC: 35.2 G/DL (ref 30–36.5)
MCV RBC AUTO: 96.4 FL (ref 80–99)
MONOCYTES # BLD: 1 K/UL (ref 0–1)
MONOCYTES NFR BLD: 8 % (ref 5–13)
NEUTS SEG # BLD: 7.9 K/UL (ref 1.8–8)
NEUTS SEG NFR BLD: 71 % (ref 32–75)
NITRITE UR QL STRIP.AUTO: NEGATIVE
PH UR STRIP: 5 (ref 5–8)
PLATELET # BLD AUTO: 248 K/UL (ref 150–400)
PMV BLD AUTO: 10.2 FL (ref 8.9–12.9)
POTASSIUM SERPL-SCNC: 5.5 MMOL/L (ref 3.5–5.1)
PROT SERPL-MCNC: 9.5 G/DL (ref 6.4–8.2)
PROT UR STRIP-MCNC: 30 MG/DL
RBC # BLD AUTO: 4.75 M/UL (ref 4.1–5.7)
RBC #/AREA URNS HPF: ABNORMAL /HPF (ref 0–5)
SODIUM SERPL-SCNC: 135 MMOL/L (ref 136–145)
SP GR UR REFRACTOMETRY: 1.02 (ref 1–1.03)
TROPONIN I SERPL HS-MCNC: 7 NG/L (ref 0–76)
TROPONIN I SERPL HS-MCNC: 8 NG/L (ref 0–76)
TSH SERPL DL<=0.05 MIU/L-ACNC: 2.37 UIU/ML (ref 0.36–3.74)
URINE CULTURE IF INDICATED: ABNORMAL
UROBILINOGEN UR QL STRIP.AUTO: 1 EU/DL (ref 0.2–1)
WBC # BLD AUTO: 11.3 K/UL (ref 4.1–11.1)
WBC URNS QL MICRO: ABNORMAL /HPF (ref 0–4)

## 2023-08-16 PROCEDURE — 82550 ASSAY OF CK (CPK): CPT

## 2023-08-16 PROCEDURE — 83735 ASSAY OF MAGNESIUM: CPT

## 2023-08-16 PROCEDURE — 99285 EMERGENCY DEPT VISIT HI MDM: CPT

## 2023-08-16 PROCEDURE — 85025 COMPLETE CBC W/AUTO DIFF WBC: CPT

## 2023-08-16 PROCEDURE — 71045 X-RAY EXAM CHEST 1 VIEW: CPT

## 2023-08-16 PROCEDURE — 6360000002 HC RX W HCPCS: Performed by: STUDENT IN AN ORGANIZED HEALTH CARE EDUCATION/TRAINING PROGRAM

## 2023-08-16 PROCEDURE — 93005 ELECTROCARDIOGRAM TRACING: CPT | Performed by: STUDENT IN AN ORGANIZED HEALTH CARE EDUCATION/TRAINING PROGRAM

## 2023-08-16 PROCEDURE — 96372 THER/PROPH/DIAG INJ SC/IM: CPT

## 2023-08-16 PROCEDURE — 36415 COLL VENOUS BLD VENIPUNCTURE: CPT

## 2023-08-16 PROCEDURE — 80053 COMPREHEN METABOLIC PANEL: CPT

## 2023-08-16 PROCEDURE — 84484 ASSAY OF TROPONIN QUANT: CPT

## 2023-08-16 PROCEDURE — 2500000003 HC RX 250 WO HCPCS: Performed by: STUDENT IN AN ORGANIZED HEALTH CARE EDUCATION/TRAINING PROGRAM

## 2023-08-16 PROCEDURE — 81001 URINALYSIS AUTO W/SCOPE: CPT

## 2023-08-16 PROCEDURE — 84439 ASSAY OF FREE THYROXINE: CPT

## 2023-08-16 PROCEDURE — 6370000000 HC RX 637 (ALT 250 FOR IP): Performed by: STUDENT IN AN ORGANIZED HEALTH CARE EDUCATION/TRAINING PROGRAM

## 2023-08-16 PROCEDURE — 84443 ASSAY THYROID STIM HORMONE: CPT

## 2023-08-16 RX ORDER — ENOXAPARIN SODIUM 300 MG/3ML
1 INJECTION INTRAVENOUS; SUBCUTANEOUS
Status: DISCONTINUED | OUTPATIENT
Start: 2023-08-16 | End: 2023-08-16 | Stop reason: RX

## 2023-08-16 RX ORDER — ENOXAPARIN SODIUM 100 MG/ML
110 INJECTION SUBCUTANEOUS
Status: COMPLETED | OUTPATIENT
Start: 2023-08-16 | End: 2023-08-16

## 2023-08-16 RX ORDER — METOPROLOL SUCCINATE 25 MG/1
25 TABLET, EXTENDED RELEASE ORAL DAILY
Qty: 30 TABLET | Refills: 0 | Status: SHIPPED | OUTPATIENT
Start: 2023-08-16 | End: 2023-09-15

## 2023-08-16 RX ORDER — DILTIAZEM HYDROCHLORIDE 5 MG/ML
0.25 INJECTION INTRAVENOUS ONCE
Status: DISCONTINUED | OUTPATIENT
Start: 2023-08-16 | End: 2023-08-16 | Stop reason: HOSPADM

## 2023-08-16 RX ADMIN — METOPROLOL TARTRATE 25 MG: 25 TABLET, FILM COATED ORAL at 18:21

## 2023-08-16 RX ADMIN — ENOXAPARIN SODIUM 110 MG: 100 INJECTION SUBCUTANEOUS at 17:04

## 2023-08-16 ASSESSMENT — LIFESTYLE VARIABLES
HOW MANY STANDARD DRINKS CONTAINING ALCOHOL DO YOU HAVE ON A TYPICAL DAY: PATIENT DOES NOT DRINK
HOW OFTEN DO YOU HAVE A DRINK CONTAINING ALCOHOL: NEVER

## 2023-08-16 NOTE — DISCHARGE INSTRUCTIONS
Thank you! Thank you for allowing me to care for you in the emergency department. I sincerely hope that you are satisfied with your visit today. It is my goal to provide you with excellent care. Below you will find a list of your labs and imaging from your visit today if applicable. Should you have any questions regarding these results please do not hesitate to call the emergency department. Please review [facility] for a more detailed result list since the below list may not be comprehensive. Instructions on how to sign up to [employer] should be provided in this packet.     Labs -     Recent Results (from the past 12 hour(s))   CBC with Auto Differential    Collection Time: 08/16/23  4:35 PM   Result Value Ref Range    WBC 11.3 (H) 4.1 - 11.1 K/uL    RBC 4.75 4.10 - 5.70 M/uL    Hemoglobin 16.1 12.1 - 17.0 g/dL    Hematocrit 45.8 36.6 - 50.3 %    MCV 96.4 80.0 - 99.0 FL    MCH 33.9 26.0 - 34.0 PG    MCHC 35.2 30.0 - 36.5 g/dL    RDW 12.7 11.5 - 14.5 %    Platelets 893 453 - 661 K/uL    MPV 10.2 8.9 - 12.9 FL    Neutrophils % 71 32 - 75 %    Lymphocytes % 19 12 - 49 %    Monocytes % 8 5 - 13 %    Eosinophils % 1 0 - 7 %    Basophils % 1 0 - 1 %    Immature Granulocytes 0 0.0 - 0.5 %    Neutrophils Absolute 7.9 1.8 - 8.0 K/UL    Lymphocytes Absolute 2.2 0.8 - 3.5 K/UL    Monocytes Absolute 1.0 0.0 - 1.0 K/UL    Eosinophils Absolute 0.1 0.0 - 0.4 K/UL    Basophils Absolute 0.1 0.0 - 0.1 K/UL    Absolute Immature Granulocyte 0.1 (H) 0.00 - 0.04 K/UL    Differential Type AUTOMATED     Comprehensive Metabolic Panel    Collection Time: 08/16/23  4:35 PM   Result Value Ref Range    Sodium 135 (L) 136 - 145 mmol/L    Potassium 5.5 (H) 3.5 - 5.1 mmol/L    Chloride 95 (L) 97 - 108 mmol/L    CO2 25 21 - 32 mmol/L    Anion Gap 15 5 - 15 mmol/L    Glucose 107 (H) 65 - 100 mg/dL    BUN 26 (H) 6 - 20 mg/dL    Creatinine 1.04 0.70 - 1.30 mg/dL    Bun/Cre Ratio 25 (H) 12 - 20      Est, Glom Filt Rate >60 >60 ml/min/1.73m2

## 2023-08-17 LAB
EKG ATRIAL RATE: 113 BPM
EKG ATRIAL RATE: 159 BPM
EKG DIAGNOSIS: NORMAL
EKG DIAGNOSIS: NORMAL
EKG Q-T INTERVAL: 278 MS
EKG Q-T INTERVAL: 294 MS
EKG QRS DURATION: 84 MS
EKG QRS DURATION: 86 MS
EKG QTC CALCULATION (BAZETT): 387 MS
EKG QTC CALCULATION (BAZETT): 461 MS
EKG R AXIS: -11 DEGREES
EKG R AXIS: -12 DEGREES
EKG T AXIS: 21 DEGREES
EKG T AXIS: 24 DEGREES
EKG VENTRICULAR RATE: 117 BPM
EKG VENTRICULAR RATE: 148 BPM
T4 FREE SERPL-MCNC: 1.1 NG/DL (ref 0.8–1.5)

## 2023-08-17 NOTE — ED PROVIDER NOTES
treatments were discussed and explained. Understanding was insured prior to discharge. Additional Disposition Considerations: admission offered to patient but he declined, he has capacity to understands the risk I provided to him of death, chronic pain, permanent disability stroke. Smoking Cessation:Smoking Cessation Counseling  Discussed the risks of smoking tobacco products and the long term sequelae of tobacco use with the patient such as increased risk of heart attack, stroke, peripheral artery disease and cancer. The patient verbalized their understanding and were provided resources if asked. Counseled patient for approximately 3 minutes. Patient Referrals:  No follow-ups on file. Discharge Medications:     Medication List        START taking these medications      metoprolol succinate 25 MG extended release tablet  Commonly known as: TOPROL XL  Take 1 tablet by mouth daily            ASK your doctor about these medications      escitalopram 10 MG tablet  Commonly known as: LEXAPRO     pantoprazole 40 MG tablet  Commonly known as: PROTONIX               Where to Get Your Medications        These medications were sent to [address] [place] [address] -  [phone] - F [phone]  [place] [address]      Phone: [phone]   metoprolol succinate 25 MG extended release tablet          Discontinued Medications:  Discharge Medication List as of 8/16/2023  7:03 PM          Procedures     Unless otherwise noted below, none. Performed by: Gomez Owens MD   Procedures      Critical Care Time     CRITICAL CARE NOTE :  8:30 PM    Critical care time is being documented due to the fulfillment of at least one of the following:    - Critical conditions: condition that acutely impairs one or more vital organ systems such that there is a high probability of imminent or life-threatening deterioration in condition.  Examples are diagnoses including but not limited

## 2024-02-10 ENCOUNTER — HOSPITAL ENCOUNTER (EMERGENCY)
Facility: HOSPITAL | Age: 29
Discharge: HOME | End: 2024-02-11
Attending: EMERGENCY MEDICINE
Payer: MEDICAID

## 2024-02-10 VITALS
BODY MASS INDEX: 34 KG/M2 | HEIGHT: 72 IN | HEART RATE: 97 BPM | TEMPERATURE: 98.7 F | RESPIRATION RATE: 18 BRPM | SYSTOLIC BLOOD PRESSURE: 151 MMHG | DIASTOLIC BLOOD PRESSURE: 92 MMHG | OXYGEN SATURATION: 100 % | WEIGHT: 251 LBS

## 2024-02-10 DIAGNOSIS — S61.012A LACERATION OF LEFT THUMB WITHOUT FOREIGN BODY WITHOUT DAMAGE TO NAIL, INITIAL ENCOUNTER: Primary | ICD-10-CM

## 2024-02-10 PROCEDURE — 99284 EMERGENCY DEPT VISIT MOD MDM: CPT

## 2024-02-11 PROCEDURE — 6360000002 HC RX W HCPCS: Performed by: EMERGENCY MEDICINE

## 2024-02-11 PROCEDURE — 6370000000 HC RX 637 (ALT 250 FOR IP): Performed by: EMERGENCY MEDICINE

## 2024-02-11 PROCEDURE — 90714 TD VACC NO PRESV 7 YRS+ IM: CPT | Performed by: EMERGENCY MEDICINE

## 2024-02-11 PROCEDURE — 90471 IMMUNIZATION ADMIN: CPT | Performed by: EMERGENCY MEDICINE

## 2024-02-11 RX ORDER — ACETAMINOPHEN 500 MG
1000 TABLET ORAL EVERY 8 HOURS PRN
Qty: 360 TABLET | Refills: 1 | Status: SHIPPED | OUTPATIENT
Start: 2024-02-11

## 2024-02-11 RX ORDER — CEPHALEXIN 500 MG/1
500 CAPSULE ORAL 3 TIMES DAILY
Qty: 21 CAPSULE | Refills: 0 | Status: SHIPPED | OUTPATIENT
Start: 2024-02-11 | End: 2024-02-18

## 2024-02-11 RX ORDER — IBUPROFEN 800 MG/1
800 TABLET ORAL
Status: COMPLETED | OUTPATIENT
Start: 2024-02-11 | End: 2024-02-11

## 2024-02-11 RX ORDER — TETANUS AND DIPHTHERIA TOXOIDS ADSORBED 2; 2 [LF]/.5ML; [LF]/.5ML
0.5 INJECTION INTRAMUSCULAR ONCE
Status: COMPLETED | OUTPATIENT
Start: 2024-02-11 | End: 2024-02-11

## 2024-02-11 RX ORDER — CEPHALEXIN 500 MG/1
500 CAPSULE ORAL
Status: COMPLETED | OUTPATIENT
Start: 2024-02-11 | End: 2024-02-11

## 2024-02-11 RX ORDER — ACETAMINOPHEN 500 MG
1000 TABLET ORAL
Status: COMPLETED | OUTPATIENT
Start: 2024-02-11 | End: 2024-02-11

## 2024-02-11 RX ORDER — IBUPROFEN 800 MG/1
800 TABLET ORAL
Qty: 20 TABLET | Refills: 0 | Status: SHIPPED | OUTPATIENT
Start: 2024-02-11

## 2024-02-11 RX ADMIN — CEPHALEXIN 500 MG: 500 CAPSULE ORAL at 00:00

## 2024-02-11 RX ADMIN — IBUPROFEN 800 MG: 800 TABLET, FILM COATED ORAL at 00:18

## 2024-02-11 RX ADMIN — TETANUS AND DIPHTHERIA TOXOIDS ADSORBED 0.5 ML: 2; 2 INJECTION INTRAMUSCULAR at 00:16

## 2024-02-11 RX ADMIN — ACETAMINOPHEN 1000 MG: 500 TABLET ORAL at 00:18

## 2024-02-11 ASSESSMENT — LIFESTYLE VARIABLES
HOW OFTEN DO YOU HAVE A DRINK CONTAINING ALCOHOL: NEVER
HOW MANY STANDARD DRINKS CONTAINING ALCOHOL DO YOU HAVE ON A TYPICAL DAY: PATIENT DOES NOT DRINK

## 2024-02-11 ASSESSMENT — PAIN SCALES - GENERAL: PAINLEVEL_OUTOF10: 5

## 2024-02-11 NOTE — ED PROVIDER NOTES
Appearance: Normal appearance. He is not ill-appearing, toxic-appearing or diaphoretic.   HENT:      Head: Normocephalic and atraumatic.   Eyes:      General: No scleral icterus.     Conjunctiva/sclera: Conjunctivae normal.      Comments: No photophobia.   Pulmonary:      Effort: Pulmonary effort is normal.   Musculoskeletal:      Cervical back: Neck supple.      Comments: Left thumb with partially avulsed nail at the tip.  No visible underlying nailbed laceration or subungual hematoma.  No active bleeding.  No swelling of the finger.   Skin:     General: Skin is warm and dry.   Neurological:      General: No focal deficit present.      Mental Status: He is alert.   Psychiatric:         Mood and Affect: Mood normal.         Behavior: Behavior normal.         Diagnostic Study Results     Labs -   No results found for this or any previous visit (from the past 12 hour(s)).    Radiologic Studies -   No orders to display     [unfilled]  [unfilled]      Medical Decision Making   I am the first provider for this patient.  I reviewed the vital signs, available nursing notes, past medical history, past surgical history, family history and social history.    Patient presents with an isolated injury to left thumb.  On exam patient has a partial avulsion of nail that without obvious nailbed laceration or subungual hematoma.  No suturing required.  Patient is was provided wound care and tetanus was up-to-date.  Patient was discharged in stable condition with an empiric course of Keflex.  Return cautions given    Vital Signs-Reviewed the patient's vital signs.  [unfilled]    Records Reviewed:  Available old ER/admission charts reviewed    ED Course:   Initial assessment performed. The patients presenting problems have been discussed, and they are in agreement with the care plan formulated and outlined with them.  I have encouraged them to ask questions as they arise throughout their visit.           PROCEDURES  No suturing

## 2024-03-08 PROCEDURE — 99283 EMERGENCY DEPT VISIT LOW MDM: CPT

## 2024-03-09 ENCOUNTER — HOSPITAL ENCOUNTER (EMERGENCY)
Facility: HOSPITAL | Age: 29
Discharge: HOME | End: 2024-03-09
Attending: EMERGENCY MEDICINE
Payer: MEDICAID

## 2024-03-09 VITALS
HEIGHT: 72 IN | OXYGEN SATURATION: 96 % | BODY MASS INDEX: 33.86 KG/M2 | HEART RATE: 87 BPM | WEIGHT: 250 LBS | TEMPERATURE: 98.4 F | DIASTOLIC BLOOD PRESSURE: 89 MMHG | SYSTOLIC BLOOD PRESSURE: 134 MMHG | RESPIRATION RATE: 20 BRPM

## 2024-03-09 DIAGNOSIS — K04.7 DENTAL ABSCESS: Primary | ICD-10-CM

## 2024-03-09 PROCEDURE — 6370000000 HC RX 637 (ALT 250 FOR IP): Performed by: EMERGENCY MEDICINE

## 2024-03-09 RX ORDER — IBUPROFEN 800 MG/1
800 TABLET ORAL
Status: COMPLETED | OUTPATIENT
Start: 2024-03-09 | End: 2024-03-09

## 2024-03-09 RX ORDER — LIDOCAINE HYDROCHLORIDE 20 MG/ML
15 SOLUTION OROPHARYNGEAL
Status: COMPLETED | OUTPATIENT
Start: 2024-03-09 | End: 2024-03-09

## 2024-03-09 RX ORDER — AMOXICILLIN AND CLAVULANATE POTASSIUM 875; 125 MG/1; MG/1
1 TABLET, FILM COATED ORAL
Status: COMPLETED | OUTPATIENT
Start: 2024-03-09 | End: 2024-03-09

## 2024-03-09 RX ORDER — ACETAMINOPHEN 500 MG
1000 TABLET ORAL
Status: COMPLETED | OUTPATIENT
Start: 2024-03-09 | End: 2024-03-09

## 2024-03-09 RX ORDER — ACETAMINOPHEN 500 MG
1000 TABLET ORAL EVERY 8 HOURS PRN
Qty: 360 TABLET | Refills: 1 | Status: SHIPPED | OUTPATIENT
Start: 2024-03-09

## 2024-03-09 RX ORDER — OMEPRAZOLE 20 MG/1
20 CAPSULE, DELAYED RELEASE ORAL DAILY
COMMUNITY

## 2024-03-09 RX ORDER — IBUPROFEN 800 MG/1
800 TABLET ORAL
Qty: 20 TABLET | Refills: 0 | Status: SHIPPED | OUTPATIENT
Start: 2024-03-09

## 2024-03-09 RX ORDER — AMOXICILLIN AND CLAVULANATE POTASSIUM 500; 125 MG/1; MG/1
1 TABLET, FILM COATED ORAL 3 TIMES DAILY
Qty: 21 TABLET | Refills: 0 | Status: SHIPPED | OUTPATIENT
Start: 2024-03-09 | End: 2024-03-16

## 2024-03-09 RX ADMIN — IBUPROFEN 800 MG: 800 TABLET, FILM COATED ORAL at 01:05

## 2024-03-09 RX ADMIN — Medication 15 ML: at 01:06

## 2024-03-09 RX ADMIN — ACETAMINOPHEN 1000 MG: 500 TABLET ORAL at 01:05

## 2024-03-09 RX ADMIN — AMOXICILLIN AND CLAVULANATE POTASSIUM 1 TABLET: 875; 125 TABLET, FILM COATED ORAL at 01:06

## 2024-03-09 ASSESSMENT — PAIN SCALES - GENERAL: PAINLEVEL_OUTOF10: 8

## 2024-03-09 ASSESSMENT — PAIN - FUNCTIONAL ASSESSMENT: PAIN_FUNCTIONAL_ASSESSMENT: 0-10

## 2024-03-09 ASSESSMENT — LIFESTYLE VARIABLES
HOW OFTEN DO YOU HAVE A DRINK CONTAINING ALCOHOL: 4 OR MORE TIMES A WEEK
HOW MANY STANDARD DRINKS CONTAINING ALCOHOL DO YOU HAVE ON A TYPICAL DAY: 1 OR 2

## 2024-03-09 NOTE — ED PROVIDER NOTES
EMERGENCY DEPARTMENT HISTORY AND PHYSICAL EXAM    Date: 3/9/2024  Patient Name: Jamar Lomas    History of Presenting Illness     Chief Complaint   Patient presents with    Dental Pain    Lab Collection       History Provided By: Patient    HPI: Jamar Lomas, 28 y.o. male   presents to the ED with cc of dental pain.  Patient complains of left lower dental pain for last 3 weeks.  Pain has been constant in moderate degree with swallowing aggravating the pain.  Patient states that he noticed swelling of the gum and \"a black spot\".  Patient is concerned for possible syphilitic infection as a cause of the gum swelling and pain.  No dysuria or hematuria.  No urinary frequency.  No penile or scrotal or anal lesions.  No abdominal pain.  No fever or chills.  No joint pain.  No rash.  No headache.        PCP: Sapna Mccoy MD    No current facility-administered medications on file prior to encounter.     Current Outpatient Medications on File Prior to Encounter   Medication Sig Dispense Refill    omeprazole (PRILOSEC) 20 MG delayed release capsule Take 1 capsule by mouth daily      acetaminophen (TYLENOL) 500 MG tablet Take 2 tablets by mouth every 8 hours as needed for Fever or Pain 360 tablet 1    ibuprofen (ADVIL;MOTRIN) 800 MG tablet Take 1 tablet by mouth 3 times daily (with meals) 20 tablet 0    metoprolol succinate (TOPROL XL) 25 MG extended release tablet Take 1 tablet by mouth daily 30 tablet 0    escitalopram (LEXAPRO) 10 MG tablet Take 1 tablet by mouth daily (Patient not taking: Reported on 3/9/2024)      pantoprazole (PROTONIX) 40 MG tablet Take 1 tablet by mouth daily (Patient not taking: Reported on 3/9/2024)         Past History     Past Medical History:  Past Medical History:   Diagnosis Date    ADHD     Anxiety     Bipolar 1 disorder (HCC)     Multiple personalities (HCC)     Stomach ulcer        Past Surgical History:  Past Surgical History:   Procedure Laterality Date    APPENDECTOMY

## 2024-03-09 NOTE — ED TRIAGE NOTES
Pt states left lower dental pain x 2-3 weeks, also possible exposure to STDs and would like testing to include HIV testing

## 2024-05-22 ENCOUNTER — HOSPITAL ENCOUNTER (EMERGENCY)
Facility: HOSPITAL | Age: 29
Discharge: HOME | End: 2024-05-22
Attending: STUDENT IN AN ORGANIZED HEALTH CARE EDUCATION/TRAINING PROGRAM
Payer: MEDICAID

## 2024-05-22 VITALS
RESPIRATION RATE: 18 BRPM | HEART RATE: 100 BPM | HEIGHT: 72 IN | DIASTOLIC BLOOD PRESSURE: 83 MMHG | SYSTOLIC BLOOD PRESSURE: 129 MMHG | WEIGHT: 237 LBS | OXYGEN SATURATION: 99 % | BODY MASS INDEX: 32.1 KG/M2 | TEMPERATURE: 98.4 F

## 2024-05-22 DIAGNOSIS — F43.9 STRESS AT HOME: Primary | ICD-10-CM

## 2024-05-22 DIAGNOSIS — Z91.148 NONCOMPLIANCE WITH MEDICATION TREATMENT DUE TO UNDERUSE OF MEDICATION: ICD-10-CM

## 2024-05-22 DIAGNOSIS — G47.00 INSOMNIA, UNSPECIFIED TYPE: ICD-10-CM

## 2024-05-22 PROCEDURE — 99283 EMERGENCY DEPT VISIT LOW MDM: CPT

## 2024-05-22 RX ORDER — ESCITALOPRAM OXALATE 10 MG/1
10 TABLET ORAL DAILY
Qty: 30 TABLET | Refills: 0 | Status: SHIPPED | OUTPATIENT
Start: 2024-05-22

## 2024-05-22 RX ORDER — ARIPIPRAZOLE 10 MG/1
10 TABLET ORAL DAILY
Qty: 30 TABLET | Refills: 0 | Status: SHIPPED | OUTPATIENT
Start: 2024-05-22

## 2024-05-22 ASSESSMENT — LIFESTYLE VARIABLES
HOW MANY STANDARD DRINKS CONTAINING ALCOHOL DO YOU HAVE ON A TYPICAL DAY: 1 OR 2
HOW OFTEN DO YOU HAVE A DRINK CONTAINING ALCOHOL: 2-4 TIMES A MONTH

## 2024-05-22 NOTE — ED TRIAGE NOTES
Pt brought in by PD for voluntary admission. States he is \"stressed\" and \"having a mental breakdown.\" Denies SI/HI. Hx of bipolar and schizophrenia. Pt states he has been without his medications for a few months

## 2024-05-22 NOTE — BSMART NOTE
Comprehensive Assessment Form Part 1      Section I - Disposition    The Medical Doctor to Psychiatrist conference was not completed.  The Medical Doctor is in agreement with intake's disposition.   The plan is discharge with outpatient follow up.   The on-call Psychiatrist was Dr. Olea.  The admitting Psychiatrist will be Dr. PETER/AVELINO.  The admitting Diagnosis is N/A.      Section II - Integrated Summary    Patient assessed in ER room 22 with 1:1 sitter noted outside of room. Patient dressed in green gown, sitting on edge of stretcher during assessment. Patient cooperative throughout assessment. Patient A&O x4. Patient appears disheveled but in NAD. Patient presents as labile and anxious- voicing frustration/anger with current living and work stressors, but also quick to laugh and joke about things. Patient presents with good eye contact. Patient presents with clear, somewhat pressured speech. Patient presents with linear thought process. Patient does not appear to be responding to internal stimuli nor does he present as psychotic. Patient states that police dropped him off at the ER due to \"having a full on breakdown.\" Patient denies SI, HI, and AVH.    Patient states that earlier this evening one of the lenses in his glasses fell out. He states that his boss then called him to come into work early tomorrow. Shortly after, his family began saying things to him that triggered him to \"go off.\" Patient states that the that his aunt called the police when he was \"going off\" because he was yelling. Patient denies any sort of physical altercation. Patient acknowledges that the events leading up to him \"going off\" were all small things that added to his baseline stress, causing him to become overwhelmed and yell/cuss at his family members. Additonally, patient states that he has not had his psychiatric medications in about 1 month and he believes that this also played a role in his actions tonight.    Patient reports Hx of

## 2024-05-22 NOTE — ED PROVIDER NOTES
pantoprazole (PROTONIX) 40 MG tablet Take 1 tablet by mouth daily       !! - Potential duplicate medications found. Please discuss with provider.         2. [facility] EMERGENCY DEPT  [address] [place] [address]  [phone]  Go to   If symptoms worsen, As needed    3.  Return to ED if worse    4.      Medication List        START taking these medications      ARIPiprazole 10 MG tablet  Commonly known as: Abilify  Take 1 tablet by mouth daily            CHANGE how you take these medications      * escitalopram 10 MG tablet  Commonly known as: LEXAPRO  What changed: Another medication with the same name was added. Make sure you understand how and when to take each.     * escitalopram 10 MG tablet  Commonly known as: LEXAPRO  Take 1 tablet by mouth daily  What changed: You were already taking a medication with the same name, and this prescription was added. Make sure you understand how and when to take each.           * This list has 2 medication(s) that are the same as other medications prescribed for you. Read the directions carefully, and ask your doctor or other care provider to review them with you.                ASK your doctor about these medications      * acetaminophen 500 MG tablet  Commonly known as: TYLENOL  Take 2 tablets by mouth every 8 hours as needed for Fever or Pain     * acetaminophen 500 MG tablet  Commonly known as: TYLENOL  Take 2 tablets by mouth every 8 hours as needed for Fever or Pain     * ibuprofen 800 MG tablet  Commonly known as: ADVIL;MOTRIN  Take 1 tablet by mouth 3 times daily (with meals)     * ibuprofen 800 MG tablet  Commonly known as: ADVIL;MOTRIN  Take 1 tablet by mouth 3 times daily (with meals)     metoprolol succinate 25 MG extended release tablet  Commonly known as: TOPROL XL  Take 1 tablet by mouth daily     omeprazole 20 MG delayed release capsule  Commonly known as: PRILOSEC     pantoprazole 40 MG tablet  Commonly known as: PROTONIX           * This

## 2024-06-07 ENCOUNTER — APPOINTMENT (OUTPATIENT)
Facility: HOSPITAL | Age: 29
End: 2024-06-07
Payer: MEDICAID

## 2024-06-07 ENCOUNTER — HOSPITAL ENCOUNTER (EMERGENCY)
Facility: HOSPITAL | Age: 29
Discharge: HOME | End: 2024-06-07
Attending: EMERGENCY MEDICINE
Payer: MEDICAID

## 2024-06-07 VITALS
HEART RATE: 79 BPM | RESPIRATION RATE: 18 BRPM | OXYGEN SATURATION: 97 % | WEIGHT: 235 LBS | BODY MASS INDEX: 31.83 KG/M2 | TEMPERATURE: 97.9 F | HEIGHT: 72 IN | DIASTOLIC BLOOD PRESSURE: 86 MMHG | SYSTOLIC BLOOD PRESSURE: 139 MMHG

## 2024-06-07 DIAGNOSIS — T74.21XA SEXUAL ASSAULT OF ADULT, INITIAL ENCOUNTER: Primary | ICD-10-CM

## 2024-06-07 LAB
ALBUMIN SERPL-MCNC: 3.8 G/DL (ref 3.5–5)
ALBUMIN SERPL-MCNC: 3.9 G/DL (ref 3.5–5)
ALBUMIN/GLOB SERPL: 1 (ref 1.1–2.2)
ALP SERPL-CCNC: 62 U/L (ref 45–117)
ALT SERPL-CCNC: 80 U/L (ref 12–78)
ANION GAP SERPL CALC-SCNC: 12 MMOL/L (ref 5–15)
APPEARANCE UR: CLEAR
AST SERPL W P-5'-P-CCNC: 135 U/L (ref 15–37)
BACTERIA URNS QL MICRO: NEGATIVE /HPF
BASOPHILS # BLD: 0.1 K/UL (ref 0–0.1)
BASOPHILS NFR BLD: 1 % (ref 0–1)
BILIRUB DIRECT SERPL-MCNC: 0.2 MG/DL (ref 0–0.2)
BILIRUB SERPL-MCNC: 0.5 MG/DL (ref 0.2–1)
BILIRUB UR QL: NEGATIVE
BUN SERPL-MCNC: 7 MG/DL (ref 6–20)
BUN/CREAT SERPL: 9 (ref 12–20)
CA-I BLD-MCNC: 8.7 MG/DL (ref 8.5–10.1)
CHLORIDE SERPL-SCNC: 102 MMOL/L (ref 97–108)
CO2 SERPL-SCNC: 26 MMOL/L (ref 21–32)
COLOR UR: ABNORMAL
CREAT SERPL-MCNC: 0.76 MG/DL (ref 0.7–1.3)
DIFFERENTIAL METHOD BLD: ABNORMAL
EOSINOPHIL # BLD: 0 K/UL (ref 0–0.4)
EOSINOPHIL NFR BLD: 1 % (ref 0–7)
ERYTHROCYTE [DISTWIDTH] IN BLOOD BY AUTOMATED COUNT: 12.6 % (ref 11.5–14.5)
GLOBULIN SER CALC-MCNC: 3.8 G/DL (ref 2–4)
GLUCOSE SERPL-MCNC: 89 MG/DL (ref 65–100)
GLUCOSE UR STRIP.AUTO-MCNC: NEGATIVE MG/DL
HCT VFR BLD AUTO: 38.2 % (ref 36.6–50.3)
HGB BLD-MCNC: 13.8 G/DL (ref 12.1–17)
HGB UR QL STRIP: NEGATIVE
IMM GRANULOCYTES # BLD AUTO: 0 K/UL (ref 0–0.04)
IMM GRANULOCYTES NFR BLD AUTO: 0 % (ref 0–0.5)
KETONES UR QL STRIP.AUTO: 5 MG/DL
LEUKOCYTE ESTERASE UR QL STRIP.AUTO: NEGATIVE
LYMPHOCYTES # BLD: 2 K/UL (ref 0.8–3.5)
LYMPHOCYTES NFR BLD: 27 % (ref 12–49)
MCH RBC QN AUTO: 34.8 PG (ref 26–34)
MCHC RBC AUTO-ENTMCNC: 36.1 G/DL (ref 30–36.5)
MCV RBC AUTO: 96.5 FL (ref 80–99)
MONOCYTES # BLD: 0.6 K/UL (ref 0–1)
MONOCYTES NFR BLD: 8 % (ref 5–13)
NEUTS SEG # BLD: 4.7 K/UL (ref 1.8–8)
NEUTS SEG NFR BLD: 63 % (ref 32–75)
NITRITE UR QL STRIP.AUTO: NEGATIVE
NRBC # BLD: 0 K/UL (ref 0–0.01)
NRBC BLD-RTO: 0 PER 100 WBC
PH UR STRIP: 6 (ref 5–8)
PHOSPHATE SERPL-MCNC: 2.6 MG/DL (ref 2.6–4.7)
PLATELET # BLD AUTO: 230 K/UL (ref 150–400)
PMV BLD AUTO: 9.9 FL (ref 8.9–12.9)
POTASSIUM SERPL-SCNC: 2.7 MMOL/L (ref 3.5–5.1)
PROT SERPL-MCNC: 7.7 G/DL (ref 6.4–8.2)
PROT UR STRIP-MCNC: 30 MG/DL
RBC # BLD AUTO: 3.96 M/UL (ref 4.1–5.7)
RBC #/AREA URNS HPF: ABNORMAL /HPF (ref 0–5)
SODIUM SERPL-SCNC: 140 MMOL/L (ref 136–145)
SP GR UR REFRACTOMETRY: 1.03 (ref 1–1.03)
URINE CULTURE IF INDICATED: ABNORMAL
UROBILINOGEN UR QL STRIP.AUTO: 4 EU/DL (ref 0.1–1)
WBC # BLD AUTO: 7.5 K/UL (ref 4.1–11.1)
WBC URNS QL MICRO: ABNORMAL /HPF (ref 0–4)

## 2024-06-07 PROCEDURE — 87661 TRICHOMONAS VAGINALIS AMPLIF: CPT

## 2024-06-07 PROCEDURE — 80069 RENAL FUNCTION PANEL: CPT

## 2024-06-07 PROCEDURE — 85025 COMPLETE CBC W/AUTO DIFF WBC: CPT

## 2024-06-07 PROCEDURE — 86706 HEP B SURFACE ANTIBODY: CPT

## 2024-06-07 PROCEDURE — 6360000002 HC RX W HCPCS: Performed by: EMERGENCY MEDICINE

## 2024-06-07 PROCEDURE — 73090 X-RAY EXAM OF FOREARM: CPT

## 2024-06-07 PROCEDURE — 80076 HEPATIC FUNCTION PANEL: CPT

## 2024-06-07 PROCEDURE — 86803 HEPATITIS C AB TEST: CPT

## 2024-06-07 PROCEDURE — 81001 URINALYSIS AUTO W/SCOPE: CPT

## 2024-06-07 PROCEDURE — 6370000000 HC RX 637 (ALT 250 FOR IP): Performed by: EMERGENCY MEDICINE

## 2024-06-07 PROCEDURE — 87563 M. GENITALIUM AMP PROBE: CPT

## 2024-06-07 PROCEDURE — 87389 HIV-1 AG W/HIV-1&-2 AB AG IA: CPT

## 2024-06-07 PROCEDURE — 99281 EMR DPT VST MAYX REQ PHY/QHP: CPT

## 2024-06-07 PROCEDURE — 4500000002 HC ER NO CHARGE

## 2024-06-07 PROCEDURE — 87591 N.GONORRHOEAE DNA AMP PROB: CPT

## 2024-06-07 PROCEDURE — 96372 THER/PROPH/DIAG INJ SC/IM: CPT

## 2024-06-07 PROCEDURE — 87491 CHLMYD TRACH DNA AMP PROBE: CPT

## 2024-06-07 PROCEDURE — 73630 X-RAY EXAM OF FOOT: CPT

## 2024-06-07 PROCEDURE — 87340 HEPATITIS B SURFACE AG IA: CPT

## 2024-06-07 PROCEDURE — 2500000003 HC RX 250 WO HCPCS: Performed by: EMERGENCY MEDICINE

## 2024-06-07 RX ORDER — ONDANSETRON 4 MG/1
4 TABLET, ORALLY DISINTEGRATING ORAL ONCE
Status: COMPLETED | OUTPATIENT
Start: 2024-06-07 | End: 2024-06-07

## 2024-06-07 RX ORDER — POTASSIUM CHLORIDE 20 MEQ/1
20 TABLET, EXTENDED RELEASE ORAL DAILY
Qty: 5 TABLET | Refills: 0 | Status: SHIPPED | OUTPATIENT
Start: 2024-06-07 | End: 2024-06-12

## 2024-06-07 RX ORDER — EMTRICITABINE AND TENOFOVIR DISOPROXIL FUMARATE 200; 300 MG/1; MG/1
1 TABLET, FILM COATED ORAL DAILY
Qty: 28 TABLET | Refills: 0 | Status: SHIPPED | OUTPATIENT
Start: 2024-06-07 | End: 2024-07-05

## 2024-06-07 RX ORDER — DOXYCYCLINE HYCLATE 100 MG/1
100 CAPSULE ORAL
Status: COMPLETED | OUTPATIENT
Start: 2024-06-07 | End: 2024-06-07

## 2024-06-07 RX ORDER — ONDANSETRON 4 MG/1
4 TABLET, FILM COATED ORAL 3 TIMES DAILY PRN
Qty: 15 TABLET | Refills: 0 | Status: SHIPPED | OUTPATIENT
Start: 2024-06-07

## 2024-06-07 RX ORDER — EMTRICITABINE AND TENOFOVIR DISOPROXIL FUMARATE 200; 300 MG/1; MG/1
1 TABLET, FILM COATED ORAL ONCE
Status: COMPLETED | OUTPATIENT
Start: 2024-06-07 | End: 2024-06-07

## 2024-06-07 RX ADMIN — LIDOCAINE HYDROCHLORIDE 500 MG: 10 INJECTION, SOLUTION EPIDURAL; INFILTRATION; INTRACAUDAL; PERINEURAL at 11:44

## 2024-06-07 RX ADMIN — ONDANSETRON 4 MG: 4 TABLET, ORALLY DISINTEGRATING ORAL at 11:45

## 2024-06-07 RX ADMIN — RALTEGRAVIR 400 MG: 400 TABLET, FILM COATED ORAL at 11:45

## 2024-06-07 RX ADMIN — DOXYCYCLINE HYCLATE 100 MG: 100 CAPSULE ORAL at 11:45

## 2024-06-07 RX ADMIN — EMTRICITABINE AND TENOFOVIR DISOPROXIL FUMARATE 1 TABLET: 200; 300 TABLET, FILM COATED ORAL at 11:45

## 2024-06-07 ASSESSMENT — PAIN SCALES - GENERAL
PAINLEVEL_OUTOF10: 0
PAINLEVEL_OUTOF10: 5
PAINLEVEL_OUTOF10: 0

## 2024-06-07 ASSESSMENT — PAIN - FUNCTIONAL ASSESSMENT: PAIN_FUNCTIONAL_ASSESSMENT: 0-10

## 2024-06-07 NOTE — PROGRESS NOTES
[employer] ([facility]) Nayely accompanied the patient and provided support. For further information, call [phone].

## 2024-06-07 NOTE — FORENSIC NURSE
[facility] completed forensic evaluation, with evidence collection and chain of custody drug screen completed maintaining chain of custody. SILVIA Adler provided resources.  [facility] relinquished evidence to [facility]. Findings discussed with Dr. Murphy, report using SBAR given to MIRANDA Nieto. Pt denies safety concerns upon discharge.

## 2024-06-07 NOTE — ED PROVIDER NOTES
Spoke to pt and the dates I was giving pt he couldn't come. Pt stated he will call back when he knows his wife's schedule. Determinants affecting Dx or Tx: None    PROCEDURES   Procedures   No procedures.     Smoking Cessation: None.    CRITICAL CARE TIME     Patient care does not meet critical care criteria.     DISPOSITION/PLAN   DISPOSITION Decision To Discharge 06/07/2024 12:27:48 PM      Discharged.     PATIENT REFERRED TO:  Sapna Mccoy MD  [address] [address]  [phone]            DISCHARGE MEDICATIONS:     Medication List        START taking these medications      emtricitabine-tenofovir 200-300 MG per tablet  Commonly known as: Truvada  Take 1 tablet by mouth daily for 28 days     ondansetron 4 MG tablet  Commonly known as: ZOFRAN  Take 1 tablet by mouth 3 times daily as needed for Nausea or Vomiting     potassium chloride 20 MEQ extended release tablet  Commonly known as: KLOR-CON M  Take 1 tablet by mouth daily for 5 days     raltegravir 400 MG tablet  Commonly known as: Isentress  Take 1 tablet by mouth 2 times daily for 28 days            ASK your doctor about these medications      * acetaminophen 500 MG tablet  Commonly known as: TYLENOL  Take 2 tablets by mouth every 8 hours as needed for Fever or Pain     * acetaminophen 500 MG tablet  Commonly known as: TYLENOL  Take 2 tablets by mouth every 8 hours as needed for Fever or Pain     ARIPiprazole 10 MG tablet  Commonly known as: Abilify  Take 1 tablet by mouth daily     * escitalopram 10 MG tablet  Commonly known as: LEXAPRO     * escitalopram 10 MG tablet  Commonly known as: LEXAPRO  Take 1 tablet by mouth daily     * ibuprofen 800 MG tablet  Commonly known as: ADVIL;MOTRIN  Take 1 tablet by mouth 3 times daily (with meals)     * ibuprofen 800 MG tablet  Commonly known as: ADVIL;MOTRIN  Take 1 tablet by mouth 3 times daily (with meals)     metoprolol succinate 25 MG extended release tablet  Commonly known as: TOPROL XL  Take 1 tablet by mouth daily     omeprazole 20 MG delayed release capsule  Commonly known as: PRILOSEC
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
## 2024-06-07 NOTE — ED NOTES
Writer went inside room to take patients vital signs. Patient was sitting upright in chair next to ED stretcher. Shortly after applying pulse ox and BP cuff, pts phone fell on ground. Pt reports no crack but then said maybe a small crack.  I did not see the patients phone anywhere on patient when applying BP cuff/pulse ox and this was also relayed to patient.     Shortly after leaving room, primary RN Gerson stated patient appears concerned about crack on phone.  Writer went back into discuss with patient. Patient reported phone was on back on stretcher when it fell. Patient then stated \"everything is fine, its okay, I just don't have extra money to fix\"

## 2024-06-08 LAB
C TRACH DNA SPEC QL NAA+PROBE: NEGATIVE
COMMENT, TMCMT: NORMAL
HBV SURFACE AB SER QL: NONREACTIVE
HBV SURFACE AB SER-ACNC: 8.71 MIU/ML
HBV SURFACE AG SER QL: <0.1 INDEX
HBV SURFACE AG SER QL: NEGATIVE
HCV AB SER IA-ACNC: <0.02 INDEX
HCV AB SERPL QL IA: NONREACTIVE
HIV 1+2 AB+HIV1 P24 AG SERPL QL IA: NONREACTIVE
HIV 1/2 RESULT COMMENT: NORMAL
MYCOPLASMA GENITALIUM, AMPLIFIED: NEGATIVE
N GONORRHOEA DNA SPEC QL NAA+PROBE: NEGATIVE
SAMPLE TYPE: NORMAL
SAMPLE TYPE:,SAMPT: NORMAL
SERVICE CMNT-IMP: NORMAL
SPECIMEN SOURCE: NORMAL
SPECIMEN SOURCE: NORMAL
T. VAGINALIS AMPLIFIED: NEGATIVE

## 2024-06-09 LAB
C TRACH DNA SPEC QL NAA+PROBE: NEGATIVE
N GONORRHOEA DNA SPEC QL NAA+PROBE: NEGATIVE
SAMPLE TYPE: NORMAL
SERVICE CMNT-IMP: NORMAL
SPECIMEN SOURCE: NORMAL

## 2024-06-10 ENCOUNTER — TELEPHONE (OUTPATIENT)
Dept: PRIMARY CARE CLINIC | Facility: CLINIC | Age: 29
End: 2024-06-10

## 2024-06-10 LAB
N GONORRHOEA RRNA NPH QL NAA+PROBE: NEGATIVE
SPECIMEN SOURCE: NORMAL

## 2024-06-10 NOTE — TELEPHONE ENCOUNTER
Nurse from hospital called to make pt a follow up gave next date for October it was too far gave her a number for another practice per supervisor.

## 2024-06-27 ENCOUNTER — TELEMEDICINE (OUTPATIENT)
Dept: PRIMARY CARE CLINIC | Facility: CLINIC | Age: 29
End: 2024-06-27
Payer: MEDICAID

## 2024-06-27 DIAGNOSIS — R79.89 ABNORMAL LIVER FUNCTION TEST: ICD-10-CM

## 2024-06-27 DIAGNOSIS — F31.9 BIPOLAR AFFECTIVE DISORDER, REMISSION STATUS UNSPECIFIED (HCC): ICD-10-CM

## 2024-06-27 DIAGNOSIS — Z20.2 SEXUALLY TRANSMITTED DISEASE EXPOSURE: Primary | ICD-10-CM

## 2024-06-27 PROCEDURE — 99214 OFFICE O/P EST MOD 30 MIN: CPT | Performed by: FAMILY MEDICINE

## 2024-06-27 RX ORDER — ONDANSETRON 4 MG/1
4 TABLET, FILM COATED ORAL DAILY PRN
Qty: 30 TABLET | Refills: 0 | Status: SHIPPED | OUTPATIENT
Start: 2024-06-27

## 2024-06-27 SDOH — ECONOMIC STABILITY: FOOD INSECURITY: WITHIN THE PAST 12 MONTHS, THE FOOD YOU BOUGHT JUST DIDN'T LAST AND YOU DIDN'T HAVE MONEY TO GET MORE.: PATIENT DECLINED

## 2024-06-27 SDOH — ECONOMIC STABILITY: HOUSING INSECURITY
IN THE LAST 12 MONTHS, WAS THERE A TIME WHEN YOU DID NOT HAVE A STEADY PLACE TO SLEEP OR SLEPT IN A SHELTER (INCLUDING NOW)?: PATIENT DECLINED

## 2024-06-27 SDOH — ECONOMIC STABILITY: FOOD INSECURITY: WITHIN THE PAST 12 MONTHS, YOU WORRIED THAT YOUR FOOD WOULD RUN OUT BEFORE YOU GOT MONEY TO BUY MORE.: PATIENT DECLINED

## 2024-06-27 SDOH — ECONOMIC STABILITY: INCOME INSECURITY: HOW HARD IS IT FOR YOU TO PAY FOR THE VERY BASICS LIKE FOOD, HOUSING, MEDICAL CARE, AND HEATING?: PATIENT DECLINED

## 2024-06-27 ASSESSMENT — PATIENT HEALTH QUESTIONNAIRE - PHQ9
5. POOR APPETITE OR OVEREATING: NEARLY EVERY DAY
SUM OF ALL RESPONSES TO PHQ QUESTIONS 1-9: 13
1. LITTLE INTEREST OR PLEASURE IN DOING THINGS: SEVERAL DAYS
SUM OF ALL RESPONSES TO PHQ QUESTIONS 1-9: 13
4. FEELING TIRED OR HAVING LITTLE ENERGY: NEARLY EVERY DAY
SUM OF ALL RESPONSES TO PHQ QUESTIONS 1-9: 13
SUM OF ALL RESPONSES TO PHQ9 QUESTIONS 1 & 2: 4
3. TROUBLE FALLING OR STAYING ASLEEP: NEARLY EVERY DAY
SUM OF ALL RESPONSES TO PHQ QUESTIONS 1-9: 13
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
## 2024-06-27 NOTE — PROGRESS NOTES
\"Have you been to the ER, urgent care clinic since your last visit?  Hospitalized since your last visit?\"    NO    “Have you seen or consulted any other health care providers outside of [facility] since your last visit?”    NO            Click Here for Release of Records Request

## 2024-12-23 ENCOUNTER — HOSPITAL ENCOUNTER (EMERGENCY)
Facility: HOSPITAL | Age: 29
Discharge: HOME | End: 2024-12-23
Payer: MEDICAID

## 2024-12-23 VITALS
DIASTOLIC BLOOD PRESSURE: 88 MMHG | HEART RATE: 84 BPM | OXYGEN SATURATION: 98 % | SYSTOLIC BLOOD PRESSURE: 139 MMHG | TEMPERATURE: 98.5 F | RESPIRATION RATE: 18 BRPM

## 2024-12-23 DIAGNOSIS — Z23 NEED FOR TETANUS BOOSTER: ICD-10-CM

## 2024-12-23 DIAGNOSIS — S41.112A LACERATION OF LEFT UPPER EXTREMITY, INITIAL ENCOUNTER: Primary | ICD-10-CM

## 2024-12-23 PROCEDURE — 90714 TD VACC NO PRESV 7 YRS+ IM: CPT

## 2024-12-23 PROCEDURE — 99284 EMERGENCY DEPT VISIT MOD MDM: CPT

## 2024-12-23 PROCEDURE — 90471 IMMUNIZATION ADMIN: CPT

## 2024-12-23 PROCEDURE — 6360000002 HC RX W HCPCS

## 2024-12-23 PROCEDURE — 12002 RPR S/N/AX/GEN/TRNK2.6-7.5CM: CPT

## 2024-12-23 RX ORDER — LIDOCAINE HYDROCHLORIDE 10 MG/ML
5 INJECTION, SOLUTION INFILTRATION; PERINEURAL
Status: COMPLETED | OUTPATIENT
Start: 2024-12-23 | End: 2024-12-23

## 2024-12-23 RX ADMIN — LIDOCAINE HYDROCHLORIDE 5 ML: 10 INJECTION, SOLUTION INFILTRATION; PERINEURAL at 20:52

## 2024-12-23 RX ADMIN — CLOSTRIDIUM TETANI TOXOID ANTIGEN (FORMALDEHYDE INACTIVATED) AND CORYNEBACTERIUM DIPHTHERIAE TOXOID ANTIGEN (FORMALDEHYDE INACTIVATED) 0.5 ML: 5; 2 INJECTION, SUSPENSION INTRAMUSCULAR at 20:50

## 2024-12-23 ASSESSMENT — PAIN SCALES - GENERAL: PAINLEVEL_OUTOF10: 3

## 2024-12-23 ASSESSMENT — PAIN - FUNCTIONAL ASSESSMENT: PAIN_FUNCTIONAL_ASSESSMENT: 0-10
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
## 2024-12-24 NOTE — ED NOTES
Report called back to Whit JEAN at [facility]. Discussed Pt needs to come back in 10-14 days for suture removal.

## 2025-01-05 ENCOUNTER — HOSPITAL ENCOUNTER (EMERGENCY)
Facility: HOSPITAL | Age: 30
Discharge: HOME | End: 2025-01-05

## 2025-01-05 VITALS
SYSTOLIC BLOOD PRESSURE: 123 MMHG | HEART RATE: 109 BPM | TEMPERATURE: 98.4 F | DIASTOLIC BLOOD PRESSURE: 90 MMHG | OXYGEN SATURATION: 97 % | RESPIRATION RATE: 16 BRPM

## 2025-01-05 DIAGNOSIS — Z48.02 VISIT FOR SUTURE REMOVAL: Primary | ICD-10-CM

## 2025-01-05 ASSESSMENT — PAIN SCALES - GENERAL: PAINLEVEL_OUTOF10: 7

## 2025-01-06 NOTE — ED PROVIDER NOTES
SHIRAZ Monroy   4 suture (s) were removed from left antecubital fossa. No signs/sxs of infection noted. Wound healing well. Sutures removed without incident or complications.   The procedure took 1-15 minutes, and pt tolerated well.      CRITICAL CARE TIME   Patient does not meet Critical Care Time, 0 minutes    ED IMPRESSION     1. Visit for suture removal          DISPOSITION/PLAN   DISPOSITION Decision To Discharge 01/05/2025 08:51:37 PM   DISPOSITION CONDITION Stable        Discharge Note: The patient is stable for discharge home. The signs, symptoms, diagnosis, and discharge instructions have been discussed, understanding conveyed, and agreed upon. The patient is to follow up as recommended or return to ER should their symptoms worsen.      PATIENT REFERRED TO:  Your Primary Care Provider    Schedule an appointment as soon as possible for a visit   As needed    [facility] EMERGENCY DE[facility]  [address] [place] [address]  [phone]    If symptoms worsen        DISCHARGE MEDICATIONS:     Medication List        ASK your doctor about these medications      ARIPiprazole 10 MG tablet  Commonly known as: Abilify  Take 1 tablet by mouth daily     emtricitabine-tenofovir 200-300 MG per tablet  Commonly known as: Truvada  Take 1 tablet by mouth daily for 28 days     escitalopram 10 MG tablet  Commonly known as: LEXAPRO  Take 1 tablet by mouth daily     metoprolol succinate 25 MG extended release tablet  Commonly known as: TOPROL XL  Take 1 tablet by mouth daily     omeprazole 20 MG delayed release capsule  Commonly known as: PRILOSEC     ondansetron 4 MG tablet  Commonly known as: ZOFRAN  Take 1 tablet by mouth daily as needed for Nausea or Vomiting     potassium chloride 20 MEQ extended release tablet  Commonly known as: KLOR-CON M  Take 1 tablet by mouth daily for 5 days                DISCONTINUED MEDICATIONS:  Current Discharge Medication List          I am the Primary Clinician of Record: Jose Alejandro Monroy

## 2025-01-06 NOTE — ED TRIAGE NOTES
Suture removal left AC 5 sutures but 1 fell out prior to arrival. Denies fever, chills, drainage to area

## 2025-01-06 NOTE — DISCHARGE INSTRUCTIONS
PRIMARY CARE in [facility]:  [address] [place] [address].  [phone]  Bev Donis MD Family Medicine  Juventino Stark MD Family Medicine  Lazaro Brown MD Family Medicine    [facility]: [address] [place] [address]. [phone]   Preston Beltran MD Family Medicine  Gloria Anders, INOCENCIA Family Medicine  Elida Ndiaye, IN[facility]ENCIA Family Medicine    [facility]: [address], [address], [place] [address]. [phone]  Sonia Hunter MD Family Medicine  Tyra Jose MD Family Medicine  Rubén Bai, [facility] Family Medicine  Elisabet Padron, IN[facility]ENCIA Family Medicine    [facility]: [address] [facility] [address]. [phone]  Edel Jamil MD Internal Medicine  Razia Sparrow MD Internal Medicine  Sg Woods MD Internal Medicine  Sasha Parsons, PA Family Medicine    [facility]: [address], [place] [address]. [phone]  Iveth Coates MD Family Medicine  Nancy An MD Family Medicine  Abraham Garcia, DO Infectious Disease  Ritesh Beaulieu MD Family Medicine    [facility]: [address], [address], [place] [address]. [phone]  Rosario Godinez,  Family Medicine  Marika Bai NP Internal Medicine  Kate Cr, IN[facility] Internal Medicine    [facility]: [address], [place] [address]. [phone]  Perla Dozier MD Internal Medicine  Martina Browning MD Internal Medicine    [facility]: [address] [place] [address]. [phone]  Graciela Hyde MD Family Medicine  Maryuri Soares MD Family Medicine  Noe Mota MD Family Medicine  Carla Hazel, [facility] Family Medicine  Chuck Bai, ARPN, CNP Family Medicine    [facility]

## 2025-01-18 ENCOUNTER — HOSPITAL ENCOUNTER (EMERGENCY)
Facility: HOSPITAL | Age: 30
Discharge: HOME | End: 2025-01-18
Attending: EMERGENCY MEDICINE
Payer: MEDICAID

## 2025-01-18 VITALS
BODY MASS INDEX: 36.1 KG/M2 | WEIGHT: 230 LBS | HEIGHT: 67 IN | HEART RATE: 127 BPM | TEMPERATURE: 98.7 F | SYSTOLIC BLOOD PRESSURE: 134 MMHG | OXYGEN SATURATION: 95 % | DIASTOLIC BLOOD PRESSURE: 92 MMHG | RESPIRATION RATE: 18 BRPM

## 2025-01-18 DIAGNOSIS — H65.06 RECURRENT ACUTE SEROUS OTITIS MEDIA OF BOTH EARS: Primary | ICD-10-CM

## 2025-01-18 PROCEDURE — 6370000000 HC RX 637 (ALT 250 FOR IP): Performed by: EMERGENCY MEDICINE

## 2025-01-18 PROCEDURE — 99283 EMERGENCY DEPT VISIT LOW MDM: CPT

## 2025-01-18 RX ORDER — PREDNISONE 20 MG/1
60 TABLET ORAL
Status: COMPLETED | OUTPATIENT
Start: 2025-01-18 | End: 2025-01-18

## 2025-01-18 RX ORDER — AMOXICILLIN 500 MG/1
500 CAPSULE ORAL 3 TIMES DAILY
Qty: 21 CAPSULE | Refills: 0 | Status: SHIPPED | OUTPATIENT
Start: 2025-01-18 | End: 2025-01-25

## 2025-01-18 RX ORDER — ACETAMINOPHEN 500 MG
1000 TABLET ORAL EVERY 8 HOURS PRN
Qty: 360 TABLET | Refills: 1 | Status: SHIPPED | OUTPATIENT
Start: 2025-01-18 | End: 2025-01-18

## 2025-01-18 RX ORDER — IBUPROFEN 200 MG
400 TABLET ORAL EVERY 8 HOURS PRN
Qty: 20 TABLET | Refills: 0 | Status: SHIPPED | OUTPATIENT
Start: 2025-01-18 | End: 2025-01-25

## 2025-01-18 RX ORDER — PREDNISONE 20 MG/1
TABLET ORAL
Qty: 12 TABLET | Refills: 1 | Status: SHIPPED | OUTPATIENT
Start: 2025-01-18

## 2025-01-18 RX ORDER — IBUPROFEN 800 MG/1
800 TABLET, FILM COATED ORAL
Status: COMPLETED | OUTPATIENT
Start: 2025-01-18 | End: 2025-01-18

## 2025-01-18 RX ORDER — ACETAMINOPHEN 500 MG
1000 TABLET ORAL
Status: COMPLETED | OUTPATIENT
Start: 2025-01-18 | End: 2025-01-18

## 2025-01-18 RX ORDER — AMOXICILLIN 500 MG/1
500 CAPSULE ORAL
Status: COMPLETED | OUTPATIENT
Start: 2025-01-18 | End: 2025-01-18

## 2025-01-18 RX ORDER — ACETAMINOPHEN 500 MG
1000 TABLET ORAL EVERY 8 HOURS PRN
Qty: 180 TABLET | Refills: 0 | Status: SHIPPED | OUTPATIENT
Start: 2025-01-18

## 2025-01-18 RX ADMIN — ACETAMINOPHEN 1000 MG: 500 TABLET, FILM COATED ORAL at 02:50

## 2025-01-18 RX ADMIN — PREDNISONE 60 MG: 20 TABLET ORAL at 02:50

## 2025-01-18 RX ADMIN — IBUPROFEN 800 MG: 800 TABLET, FILM COATED ORAL at 02:50

## 2025-01-18 RX ADMIN — AMOXICILLIN 500 MG: 500 CAPSULE ORAL at 03:02

## 2025-01-18 ASSESSMENT — PAIN SCALES - GENERAL: PAINLEVEL_OUTOF10: 7

## 2025-01-18 ASSESSMENT — PAIN - FUNCTIONAL ASSESSMENT: PAIN_FUNCTIONAL_ASSESSMENT: 0-10

## 2025-01-18 NOTE — ED PROVIDER NOTES
[facility] EMERGENCY DEPT  EMERGENCY DEPARTMENT HISTORY AND PHYSICAL EXAM      Date: 2025  Patient Name: Jamar Lomas  MRN: [id number]  Birthdate 1995  Date of evaluation: 2025  Provider: Leander Raymundo MD  Note Started: 3:03 AM EST 25    HISTORY OF PRESENT ILLNESS     Chief Complaint   Patient presents with    Ear Pain       History Provided By: Patient    HPI: Jamar Lomas is a 29 y.o. male.  Patient presents with a complaint of bilateral ear pain for last 1 month.  Left worse than right.  No drainage.  No tinnitus.  Mildly muffled hearing.  Patient states that he was recently treated for ear infection with a course of amoxicillin with improvement.  No URI symptoms.  No headache.  No OTC treatment        PAST MEDICAL HISTORY   Past Medical History:  Past Medical History:   Diagnosis Date    ADHD     Anxiety     Bipolar 1 disorder (HCC)     Multiple personalities (HCC)     Stomach ulcer        Past Surgical History:  Past Surgical History:   Procedure Laterality Date    APPENDECTOMY         Family History:  History reviewed. No pertinent family history.    Social History:  Social History     Tobacco Use    Smoking status: Every Day     Current packs/day: 0.00     Types: Cigarettes     Last attempt to quit: 2023     Years since quittin.0    Smokeless tobacco: Never   Vaping Use    Vaping status: Never Used   Substance Use Topics    Alcohol use: Yes     Comment: occ    Drug use: Not Currently     Types: Marijuana (Weed)       Allergies:  Allergies   Allergen Reactions    Morphine Other (See Comments)     Patient reports hallucinations    Erythromycin Nausea And Vomiting       PCP: No primary care provider on file.    Current Meds:   No current facility-administered medications for this encounter.     Current Outpatient Medications   Medication Sig Dispense Refill    ibuprofen (ADVIL) 200 MG tablet Take 2 tablets by mouth every 8 hours as needed for Pain 20 tablet 0     been discussed, understanding conveyed, and agreed upon. The patient is to follow up as recommended or return to ER should their symptoms worsen.      PATIENT REFERRED TO:  Tristin Mckeon MD  [address] [place] [address] 6  [facility] [address] [phone]              DISCHARGE MEDICATIONS:     Medication List        START taking these medications      acetaminophen 500 MG tablet  Commonly known as: TYLENOL  Take 2 tablets by mouth every 8 hours as needed for Fever or Pain     amoxicillin 500 MG capsule  Commonly known as: AMOXIL  Take 1 capsule by mouth 3 times daily for 7 days     ibuprofen 200 MG tablet  Commonly known as: Advil  Take 2 tablets by mouth every 8 hours as needed for Pain     predniSONE 20 MG tablet  Commonly known as: DELTASONE  Take 3 tablets once a day for 2 days, then take 2 tablets once a day for 2 days, then take 1 take once a day for 2 days            ASK your doctor about these medications      ARIPiprazole 10 MG tablet  Commonly known as: Abilify  Take 1 tablet by mouth daily     emtricitabine-tenofovir 200-300 MG per tablet  Commonly known as: Truvada  Take 1 tablet by mouth daily for 28 days     escitalopram 10 MG tablet  Commonly known as: LEXAPRO  Take 1 tablet by mouth daily     metoprolol succinate 25 MG extended release tablet  Commonly known as: TOPROL XL  Take 1 tablet by mouth daily     omeprazole 20 MG delayed release capsule  Commonly known as: PRILOSEC     ondansetron 4 MG tablet  Commonly known as: ZOFRAN  Take 1 tablet by mouth daily as needed for Nausea or Vomiting     potassium chloride 20 MEQ extended release tablet  Commonly known as: KLOR-CON M  Take 1 tablet by mouth daily for 5 days               Where to Get Your Medications        These medications were sent to [id number] - [place], [place] - [facility] - P [phone] - F [phone]  [facility] [place] [facility] [address]      Phone: [phone]   acetaminophen 500 MG tablet  amoxicillin 500

## 2025-02-26 ENCOUNTER — HOSPITAL ENCOUNTER (EMERGENCY)
Facility: HOSPITAL | Age: 30
Discharge: HOME | End: 2025-02-26
Payer: MEDICAID

## 2025-02-26 VITALS
HEART RATE: 86 BPM | OXYGEN SATURATION: 98 % | SYSTOLIC BLOOD PRESSURE: 148 MMHG | DIASTOLIC BLOOD PRESSURE: 89 MMHG | WEIGHT: 220 LBS | HEIGHT: 67 IN | TEMPERATURE: 98.9 F | RESPIRATION RATE: 17 BRPM | BODY MASS INDEX: 34.53 KG/M2

## 2025-02-26 DIAGNOSIS — K08.89 DENTALGIA: Primary | ICD-10-CM

## 2025-02-26 DIAGNOSIS — R82.5 POSITIVE URINE DRUG SCREEN: ICD-10-CM

## 2025-02-26 DIAGNOSIS — R44.0 AUDITORY HALLUCINATIONS: ICD-10-CM

## 2025-02-26 DIAGNOSIS — F20.9 SCHIZOPHRENIA, UNSPECIFIED TYPE (HCC): ICD-10-CM

## 2025-02-26 DIAGNOSIS — F10.10 ALCOHOL ABUSE: ICD-10-CM

## 2025-02-26 DIAGNOSIS — J01.90 ACUTE SINUSITIS, RECURRENCE NOT SPECIFIED, UNSPECIFIED LOCATION: ICD-10-CM

## 2025-02-26 LAB
ALBUMIN SERPL-MCNC: 4 G/DL (ref 3.5–5)
ALBUMIN/GLOB SERPL: 0.9 (ref 1.1–2.2)
ALP SERPL-CCNC: 97 U/L (ref 45–117)
ALT SERPL-CCNC: 33 U/L (ref 12–78)
AMPHET UR QL SCN: POSITIVE
ANION GAP SERPL CALC-SCNC: 11 MMOL/L (ref 2–12)
APPEARANCE UR: CLEAR
AST SERPL W P-5'-P-CCNC: 36 U/L (ref 15–37)
BACTERIA URNS QL MICRO: NEGATIVE /HPF
BARBITURATES UR QL SCN: NEGATIVE
BASOPHILS # BLD: 0.07 K/UL (ref 0–0.1)
BASOPHILS NFR BLD: 1 % (ref 0–1)
BENZODIAZ UR QL: NEGATIVE
BILIRUB SERPL-MCNC: 0.3 MG/DL (ref 0.2–1)
BILIRUB UR QL: NEGATIVE
BUN SERPL-MCNC: 9 MG/DL (ref 6–20)
BUN/CREAT SERPL: 9 (ref 12–20)
BUPRENORPHINE UR QL: NEGATIVE
CA-I BLD-MCNC: 9.2 MG/DL (ref 8.5–10.1)
CANNABINOIDS UR QL SCN: NEGATIVE
CHLORIDE SERPL-SCNC: 99 MMOL/L (ref 97–108)
CO2 SERPL-SCNC: 29 MMOL/L (ref 21–32)
COCAINE UR QL SCN: NEGATIVE
COLOR UR: ABNORMAL
CREAT SERPL-MCNC: 1.02 MG/DL (ref 0.7–1.3)
DIFFERENTIAL METHOD BLD: ABNORMAL
EOSINOPHIL # BLD: 0.04 K/UL (ref 0–0.4)
EOSINOPHIL NFR BLD: 0.6 % (ref 0–7)
EPITH CASTS URNS QL MICRO: ABNORMAL /LPF
ERYTHROCYTE [DISTWIDTH] IN BLOOD BY AUTOMATED COUNT: 12.9 % (ref 11.5–14.5)
ETHANOL SERPL-MCNC: 83 MG/DL (ref 0–0.08)
GLOBULIN SER CALC-MCNC: 4.7 G/DL (ref 2–4)
GLUCOSE SERPL-MCNC: 88 MG/DL (ref 65–100)
GLUCOSE UR STRIP.AUTO-MCNC: NEGATIVE MG/DL
HCT VFR BLD AUTO: 44.1 % (ref 36.6–50.3)
HGB BLD-MCNC: 14.7 G/DL (ref 12.1–17)
HGB UR QL STRIP: NEGATIVE
IMM GRANULOCYTES # BLD AUTO: 0.02 K/UL (ref 0–0.04)
IMM GRANULOCYTES NFR BLD AUTO: 0.3 % (ref 0–0.5)
KETONES UR QL STRIP.AUTO: 15 MG/DL
LEUKOCYTE ESTERASE UR QL STRIP.AUTO: NEGATIVE
LYMPHOCYTES # BLD: 1.84 K/UL (ref 0.8–3.5)
LYMPHOCYTES NFR BLD: 27.5 % (ref 12–49)
Lab: ABNORMAL
MCH RBC QN AUTO: 31.5 PG (ref 26–34)
MCHC RBC AUTO-ENTMCNC: 33.3 G/DL (ref 30–36.5)
MCV RBC AUTO: 94.6 FL (ref 80–99)
METHADONE UR QL: NEGATIVE
METHAMPHET UR QL: POSITIVE
MONOCYTES # BLD: 0.51 K/UL (ref 0–1)
MONOCYTES NFR BLD: 7.6 % (ref 5–13)
NEUTS SEG # BLD: 4.22 K/UL (ref 1.8–8)
NEUTS SEG NFR BLD: 63 % (ref 32–75)
NITRITE UR QL STRIP.AUTO: NEGATIVE
OPIATES UR QL: NEGATIVE
OXYCODONE UR QL SCN: POSITIVE
PCP UR QL: NEGATIVE
PH UR STRIP: 7 (ref 5–8)
PLATELET # BLD AUTO: 315 K/UL (ref 150–400)
PMV BLD AUTO: 8.7 FL (ref 8.9–12.9)
POTASSIUM SERPL-SCNC: 4.3 MMOL/L (ref 3.5–5.1)
PROPOXYPH UR QL: NEGATIVE
PROT SERPL-MCNC: 8.7 G/DL (ref 6.4–8.2)
PROT UR STRIP-MCNC: NEGATIVE MG/DL
RBC # BLD AUTO: 4.66 M/UL (ref 4.1–5.7)
RBC #/AREA URNS HPF: ABNORMAL /HPF (ref 0–5)
SODIUM SERPL-SCNC: 139 MMOL/L (ref 136–145)
SP GR UR REFRACTOMETRY: 1.01 (ref 1–1.03)
TRICYCLICS UR QL: NEGATIVE
URINE CULTURE IF INDICATED: ABNORMAL
UROBILINOGEN UR QL STRIP.AUTO: 0.1 EU/DL (ref 0.2–1)
WBC # BLD AUTO: 6.7 K/UL (ref 4.1–11.1)
WBC URNS QL MICRO: ABNORMAL /HPF (ref 0–4)

## 2025-02-26 PROCEDURE — 36415 COLL VENOUS BLD VENIPUNCTURE: CPT

## 2025-02-26 PROCEDURE — 80053 COMPREHEN METABOLIC PANEL: CPT

## 2025-02-26 PROCEDURE — 99284 EMERGENCY DEPT VISIT MOD MDM: CPT

## 2025-02-26 PROCEDURE — 85025 COMPLETE CBC W/AUTO DIFF WBC: CPT

## 2025-02-26 PROCEDURE — 6370000000 HC RX 637 (ALT 250 FOR IP): Performed by: NURSE PRACTITIONER

## 2025-02-26 PROCEDURE — 82077 ASSAY SPEC XCP UR&BREATH IA: CPT

## 2025-02-26 PROCEDURE — 80307 DRUG TEST PRSMV CHEM ANLYZR: CPT

## 2025-02-26 PROCEDURE — 2580000003 HC RX 258: Performed by: NURSE PRACTITIONER

## 2025-02-26 PROCEDURE — 81001 URINALYSIS AUTO W/SCOPE: CPT

## 2025-02-26 RX ORDER — 0.9 % SODIUM CHLORIDE 0.9 %
1000 INTRAVENOUS SOLUTION INTRAVENOUS ONCE
Status: COMPLETED | OUTPATIENT
Start: 2025-02-26 | End: 2025-02-26

## 2025-02-26 RX ORDER — FOLIC ACID 1 MG/1
1 TABLET ORAL
Status: COMPLETED | OUTPATIENT
Start: 2025-02-26 | End: 2025-02-26

## 2025-02-26 RX ORDER — DIAZEPAM 10 MG/2ML
5 INJECTION, SOLUTION INTRAMUSCULAR; INTRAVENOUS
Status: DISCONTINUED | OUTPATIENT
Start: 2025-02-26 | End: 2025-02-26

## 2025-02-26 RX ORDER — GAUZE BANDAGE 2" X 2"
100 BANDAGE TOPICAL ONCE
Status: COMPLETED | OUTPATIENT
Start: 2025-02-26 | End: 2025-02-26

## 2025-02-26 RX ORDER — LORAZEPAM 2 MG/ML
2 INJECTION INTRAMUSCULAR
Status: DISCONTINUED | OUTPATIENT
Start: 2025-02-26 | End: 2025-02-26 | Stop reason: HOSPADM

## 2025-02-26 RX ADMIN — FOLIC ACID 1 MG: 1 TABLET ORAL at 14:05

## 2025-02-26 RX ADMIN — THIAMINE HCL TAB 100 MG 100 MG: 100 TAB at 14:05

## 2025-02-26 RX ADMIN — SODIUM CHLORIDE 1000 ML: 0.9 INJECTION, SOLUTION INTRAVENOUS at 14:00

## 2025-02-26 ASSESSMENT — PAIN - FUNCTIONAL ASSESSMENT: PAIN_FUNCTIONAL_ASSESSMENT: 0-10

## 2025-02-26 ASSESSMENT — PAIN SCALES - GENERAL
PAINLEVEL_OUTOF10: 4
PAINLEVEL_OUTOF10: 4
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
## 2025-02-26 NOTE — BSMART NOTE
Comprehensive Assessment Form Part 1      Section I - Disposition    The Medical Doctor to Psychiatrist conference was notcompleted.  The Medical Doctor is in agreement with BSMART disposition because of (reason) pt does not meet criteria for IP BH at this time.  The plan is d/c and to f/u with his psychiatrist.  The on-call Psychiatrist consulted was  .  The admitting Psychiatrist will be  .  The admitting Diagnosis is .  The Payor source is Medicaid.    BSMART assessment completed, and suicide risk level noted to be no. Primary Nurse Lindsay and Physician Ambar notified. Concerns not observed.     This writer reviewed the [place] Suicide Severity Rating Scale in nursing flowsheet and the risk level assigned is no risk.  Based on this assessment, the risk of suicide is no risk and the plan is f/u with his psychiatrist.    Section II - Integrated Summary  Summary:  Pt assessed face to face in [facility] 6 via T[facility].  Pt laying on the stretcher fully dressed, awake alert oriented flat affect as his is having pain in his mouth, and answers all questions asked with direct eye contact.  Pt states he came to the ED for medical c/o and mentioned the AH.  Pt states that he would not have come to the ED for the AH.  Pt states he was dx with Schizophrenia in Dec 2024 and the voices are not new.  Pt denies command Hallucinations.  Pt denies SI HI VH.     Pt states that he lives with his Aunt and Mom in [facility].  States that he just started a new job at [employer] and was sent home early today for his mouth pain.         The patient has demonstrated mental capacity to provide informed consent.  The information is given by the patient.  The Chief Complaint is as above.  The Precipitant Factors are as above.  Previous Hospitalizations: yes  The patient has not previously been in restraints.  Current Psychiatrist and/or  is yes.    Lethality Assessment:    The potential for suicide noted by the following: not

## 2025-02-26 NOTE — ED PROVIDER NOTES
notes    Vitals:    Vitals:    02/26/25 1320 02/26/25 1530   BP: 121/82 (!) 148/89   Pulse: 98 86   Resp: 16 17   Temp: 98.9 °F (37.2 °C)    TempSrc: Oral    SpO2: 95% 98%   Weight: 99.8 kg (220 lb)    Height: 1.702 m (5' 7\")         ED COURSE  ED Course as of 02/26/25 1536   Wed Feb 26, 2025   1418 CBC without leukocytosis or significant differential abnormality and is otherwise unremarkable.  Current infectious process is unlikely given this finding along with patient being afebrile and recently completed antibiotics. [KR]   1430 Ethanol Lvl(!): 83 [KR]   1430 Amphetamine, Urine(!): Positive [KR]   1430 Methamphetamine, Urine(!): Positive [KR]   1430 Oxycodone Urine(!): Positive [KR]   1535 Evaluated by behavioral health.  Patient does not meet criteria for admission.  Upon her discovery the patient denied any use of methamphetamines as he is not supposed to use them on his medications but did recently obtain a vape from a friend and he believes that it could have been laced with something which would explain why he is having such difficulty for the past few days and intends to throw it away and not to obtain or take anything from this friend again.  He feels much better after his IV fluids and some sleep and is not currently having thoughts of self-harm or auditory hallucinations and voices comfort with discharge to continue his antibiotics and follow-up with his psychiatrist and the dentist. [KR]      ED Course User Index  [KR] Erin Jean, APRN - NP       SEPSIS Reassessment: Patient does NOT meet Sepsis criteria after ED workup    Clinical Management Tools:  Not Applicable    Patient was given the following medications:  Medications   LORazepam (ATIVAN) injection 2 mg (has no administration in time range)   sodium chloride 0.9 % bolus 1,000 mL (0 mLs IntraVENous Stopped 2/26/25 1523)   folic acid (FOLVITE) tablet 1 mg (1 mg Oral Given 2/26/25 1405)   thiamine mononitrate tablet 100 mg (100 mg Oral Given

## 2025-02-26 NOTE — ED NOTES
Patient to be evaluated by BSMART.   Patient reports not having any suicidal or homicidal thoughts at this time. Reports currently having auditory hallucinations.

## 2025-02-26 NOTE — ED NOTES
Patient evaluated by BSMART. Currently resting on stretcher, eyes closed, respirations even and unlabored. NAD

## 2025-03-13 ENCOUNTER — OFFICE VISIT (OUTPATIENT)
Age: 30
End: 2025-03-13

## 2025-03-13 ENCOUNTER — PROCEDURE VISIT (OUTPATIENT)
Age: 30
End: 2025-03-13

## 2025-03-13 VITALS
HEIGHT: 71 IN | RESPIRATION RATE: 16 BRPM | SYSTOLIC BLOOD PRESSURE: 120 MMHG | HEART RATE: 115 BPM | WEIGHT: 238 LBS | DIASTOLIC BLOOD PRESSURE: 62 MMHG | OXYGEN SATURATION: 98 % | BODY MASS INDEX: 33.32 KG/M2

## 2025-03-13 DIAGNOSIS — H93.12 TINNITUS OF LEFT EAR: ICD-10-CM

## 2025-03-13 DIAGNOSIS — H66.90 ACUTE OTITIS MEDIA, UNSPECIFIED OTITIS MEDIA TYPE: Primary | ICD-10-CM

## 2025-03-13 DIAGNOSIS — H93.12 TINNITUS OF LEFT EAR: Primary | ICD-10-CM

## 2025-03-13 NOTE — PROGRESS NOTES
Referral Priority:   Routine     Referral Type:   Eval and Treat     Referral Reason:   Specialty Services Required     Requested Specialty:   Family Medicine     Number of Visits Requested:   1      No follow-ups on file.     The plan for the patient's medical issues were discussed. Options include observation, medical management, and possible surgical/procedural intervention, if they fail conservative therapy. The risks and benefits of the considered procedures were discussed with the patient. All questions were answered.     The patient was instructed to return to clinic if they have no improvement or worsening of their symptoms. Concerning signs to watch out for were reviewed with them.    This note was written with the assistance of dictation software.  Errors in dictation may occur despite proofreading.  Please disregard any dictation errors.      Tristin Mckeon MD   [facility]  [address]  [place] [address]  Office Phone: [phone]

## 2025-03-13 NOTE — PROGRESS NOTES
Jamar Lomas   1995, 29 y.o. male   [id number]       Referring Provider: Tristin Mckeon MD  Referral Type: In an order in [facility]    ADULT AUDIOLOGIC EVALUATION      Jamar Lomas is a 29 y.o. male seen today, 3/13/2025 , for an audiologic evaluation.  Patient was seen by otolaryngology following today's evaluation.    AUDIOLOGIC AND OTHER PERTINENT MEDICAL HISTORY:      Jamar Lomas reports left tinnitus. He recently got over a bilateral ear infection. 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    He denied otalgia, aural fullness, otorrhea, dizziness, and imbalance    Date: 3/13/2025     IMPRESSIONS:      Today's results revealed Normal hearing 250-8000 Hz bilaterally. Excellent speech understanding when in quiet. Tympanometry indicates normal middle ear function.. Discussed test results and implications with patient. Discussed noise exposure and the importance of appropriate hearing protection when in hazardous noise environments.    Follow medical recommendations of primary care physician and referring provider.     ASSESSMENT AND FINDINGS:     Otoscopy unremarkable.    RIGHT EAR:  Hearing Sensitivity: Normal hearing sensitivity 250-8000 Hz  Speech Recognition Threshold: 5 dB HL  Word Recognition: Excellent 100%, based on NU-6 by-difficulty list at 50 dB HL using recorded speech stimuli.    Tympanometry: Normal peak pressure and compliance, Type A tympanogram, consistent with normal middle ear function.       LEFT EAR:  Hearing Sensitivity: Normal hearing sensitivity 250-8000 Hz  Speech Recognition Threshold: 0 dB HL  Word Recognition: Excellent 100%, based on NU-6 by-difficulty list at 50 dB HL using recorded speech stimuli.    Tympanometry: Normal peak pressure and compliance, Type A tympanogram, consistent with normal middle ear function.       Reliability: Good   Transducer: Inserts    See scanned audiogram dated 3/13/2025 for results.        PATIENT EDUCATION:       The following items

## 2025-03-13 NOTE — PATIENT INSTRUCTIONS
you need help quitting, talk to your doctor about stop-smoking programs and medicines. These can increase your chances of quitting for good.  Talk to your doctor about whether to stop taking aspirin and similar products such as ibuprofen or naproxen.  Get exercise often. It can help improve blood flow to the ear.    Ways to manage/cope with tinnitus  Some tinnitus may last a long time. To manage your tinnitus, try to:  Avoid noises that you think caused your tinnitus. If you can't avoid loud noises, wear earplugs or earmuffs.  Ignore the sound by paying attention to other things.  Keeping your brain busy with other tasks or background noise can help your brain not focus on the tinnitus.  Try to not give the tinnitus an emotional reaction.  Do your best to ignore the sound and not let it bother you. Relax using biofeedback, meditation, or yoga. Feeling stressed and being tired can make tinnitus worse.  Play music or white noise to help you sleep.  Background noise may cover up the noise that you hear in your ears.  You can buy a tabletop machine or a device that sits under your pillow to play soothing sounds, like ocean waves.  Smart phones have free apps, such as Whist, Relax Melodies, ReSound Relief, and White Noise Lite.  These apps have different types of sounds/noise, some of which you can blend together to find sounds that are most soothing to you.  Hearing aid technology, especially when there is some hearing loss, may help reduce tinnitus symptoms by giving your brain better access to the sounds it is missing.  There are some hearing aids with built-in noise generator programs, which may help when amplification alone is not enough.        Additional resources may be found through the [employer] at www.mia.org    When should you call for help?  Call 911 anytime you think you may need emergency care. For example, call if:    You have symptoms of a stroke. These may include:  Sudden numbness,